# Patient Record
Sex: MALE | Race: WHITE | NOT HISPANIC OR LATINO | Employment: FULL TIME | URBAN - METROPOLITAN AREA
[De-identification: names, ages, dates, MRNs, and addresses within clinical notes are randomized per-mention and may not be internally consistent; named-entity substitution may affect disease eponyms.]

---

## 2017-01-27 ENCOUNTER — HOSPITAL ENCOUNTER (OUTPATIENT)
Dept: LAB | Facility: MEDICAL CENTER | Age: 52
End: 2017-01-27
Attending: NURSE PRACTITIONER
Payer: COMMERCIAL

## 2017-01-27 LAB
ALBUMIN SERPL BCP-MCNC: 4.5 G/DL (ref 3.2–4.9)
ALBUMIN/GLOB SERPL: 1.7 G/DL
ALP SERPL-CCNC: 34 U/L (ref 30–99)
ALT SERPL-CCNC: 17 U/L (ref 2–50)
ANION GAP SERPL CALC-SCNC: 3 MMOL/L (ref 0–11.9)
AST SERPL-CCNC: 15 U/L (ref 12–45)
BILIRUB SERPL-MCNC: 0.4 MG/DL (ref 0.1–1.5)
BUN SERPL-MCNC: 21 MG/DL (ref 8–22)
CALCIUM SERPL-MCNC: 9.4 MG/DL (ref 8.5–10.5)
CHLORIDE SERPL-SCNC: 110 MMOL/L (ref 96–112)
CHOLEST SERPL-MCNC: 185 MG/DL (ref 100–199)
CO2 SERPL-SCNC: 25 MMOL/L (ref 20–33)
CREAT SERPL-MCNC: 1.18 MG/DL (ref 0.5–1.4)
GLOBULIN SER CALC-MCNC: 2.6 G/DL (ref 1.9–3.5)
GLUCOSE SERPL-MCNC: 106 MG/DL (ref 65–99)
HDLC SERPL-MCNC: 63 MG/DL
LDLC SERPL CALC-MCNC: 96 MG/DL
POTASSIUM SERPL-SCNC: 4.3 MMOL/L (ref 3.6–5.5)
PROT SERPL-MCNC: 7.1 G/DL (ref 6–8.2)
PSA SERPL DL<=0.01 NG/ML-MCNC: 0.65 NG/ML (ref 0–4)
SODIUM SERPL-SCNC: 138 MMOL/L (ref 135–145)
TRIGL SERPL-MCNC: 131 MG/DL (ref 0–149)

## 2017-01-27 PROCEDURE — 84153 ASSAY OF PSA TOTAL: CPT

## 2017-01-27 PROCEDURE — 36415 COLL VENOUS BLD VENIPUNCTURE: CPT

## 2017-01-27 PROCEDURE — 80061 LIPID PANEL: CPT

## 2017-01-27 PROCEDURE — 80053 COMPREHEN METABOLIC PANEL: CPT

## 2017-09-06 DIAGNOSIS — Z01.812 PRE-OPERATIVE LABORATORY EXAMINATION: ICD-10-CM

## 2017-09-06 LAB
ANION GAP SERPL CALC-SCNC: 7 MMOL/L (ref 0–11.9)
BUN SERPL-MCNC: 13 MG/DL (ref 8–22)
CALCIUM SERPL-MCNC: 10.2 MG/DL (ref 8.5–10.5)
CHLORIDE SERPL-SCNC: 104 MMOL/L (ref 96–112)
CO2 SERPL-SCNC: 27 MMOL/L (ref 20–33)
CREAT SERPL-MCNC: 1.14 MG/DL (ref 0.5–1.4)
ERYTHROCYTE [DISTWIDTH] IN BLOOD BY AUTOMATED COUNT: 37.1 FL (ref 35.9–50)
GFR SERPL CREATININE-BSD FRML MDRD: >60 ML/MIN/1.73 M 2
GLUCOSE SERPL-MCNC: 87 MG/DL (ref 65–99)
HCT VFR BLD AUTO: 42.7 % (ref 42–52)
HGB BLD-MCNC: 14.7 G/DL (ref 14–18)
MCH RBC QN AUTO: 29.7 PG (ref 27–33)
MCHC RBC AUTO-ENTMCNC: 34.4 G/DL (ref 33.7–35.3)
MCV RBC AUTO: 86.3 FL (ref 81.4–97.8)
PLATELET # BLD AUTO: 246 K/UL (ref 164–446)
PMV BLD AUTO: 10.5 FL (ref 9–12.9)
POTASSIUM SERPL-SCNC: 3.6 MMOL/L (ref 3.6–5.5)
RBC # BLD AUTO: 4.95 M/UL (ref 4.7–6.1)
SODIUM SERPL-SCNC: 138 MMOL/L (ref 135–145)
WBC # BLD AUTO: 6.4 K/UL (ref 4.8–10.8)

## 2017-09-06 PROCEDURE — 80048 BASIC METABOLIC PNL TOTAL CA: CPT

## 2017-09-06 PROCEDURE — 85027 COMPLETE CBC AUTOMATED: CPT

## 2017-09-06 PROCEDURE — 36415 COLL VENOUS BLD VENIPUNCTURE: CPT

## 2017-09-06 RX ORDER — FENOFIBRATE 160 MG/1
160 TABLET ORAL DAILY
COMMUNITY
End: 2018-07-06 | Stop reason: SDUPTHER

## 2017-09-06 RX ORDER — ZOLPIDEM TARTRATE 10 MG/1
10 TABLET ORAL NIGHTLY PRN
COMMUNITY
End: 2018-07-06 | Stop reason: SDUPTHER

## 2017-09-16 ENCOUNTER — HOSPITAL ENCOUNTER (OUTPATIENT)
Facility: MEDICAL CENTER | Age: 52
End: 2017-09-16
Payer: COMMERCIAL

## 2017-09-16 LAB
ALBUMIN SERPL BCP-MCNC: 4.5 G/DL (ref 3.2–4.9)
ALBUMIN/GLOB SERPL: 1.5 G/DL
ALP SERPL-CCNC: 30 U/L (ref 30–99)
ALT SERPL-CCNC: 18 U/L (ref 2–50)
ANION GAP SERPL CALC-SCNC: 7 MMOL/L (ref 0–11.9)
AST SERPL-CCNC: 17 U/L (ref 12–45)
BDY FAT % MEASURED: 27.4 %
BILIRUB SERPL-MCNC: 0.6 MG/DL (ref 0.1–1.5)
BP DIAS: 75 MMHG
BP SYS: 118 MMHG
BUN SERPL-MCNC: 14 MG/DL (ref 8–22)
CALCIUM SERPL-MCNC: 9.9 MG/DL (ref 8.5–10.5)
CHLORIDE SERPL-SCNC: 106 MMOL/L (ref 96–112)
CHOLEST SERPL-MCNC: 162 MG/DL (ref 100–199)
CO2 SERPL-SCNC: 25 MMOL/L (ref 20–33)
CREAT SERPL-MCNC: 1.13 MG/DL (ref 0.5–1.4)
DIABETES HTDIA: NO
EVENT NAME HTEVT: NORMAL
GFR SERPL CREATININE-BSD FRML MDRD: >60 ML/MIN/1.73 M 2
GLOBULIN SER CALC-MCNC: 3 G/DL (ref 1.9–3.5)
GLUCOSE SERPL-MCNC: 93 MG/DL (ref 65–99)
HDLC SERPL-MCNC: 62 MG/DL
HYPERTENSION HTHYP: NO
LDLC SERPL CALC-MCNC: 86 MG/DL
POTASSIUM SERPL-SCNC: 4.4 MMOL/L (ref 3.6–5.5)
PROT SERPL-MCNC: 7.5 G/DL (ref 6–8.2)
SCREENING LOC CITY HTCIT: NORMAL
SCREENING LOC STATE HTSTA: NORMAL
SCREENING LOCATION HTLOC: NORMAL
SODIUM SERPL-SCNC: 138 MMOL/L (ref 135–145)
SUBSCRIBER ID HTSID: NORMAL
TRIGL SERPL-MCNC: 71 MG/DL (ref 0–149)

## 2017-09-20 ENCOUNTER — HOSPITAL ENCOUNTER (OUTPATIENT)
Facility: MEDICAL CENTER | Age: 52
End: 2017-09-20
Attending: SURGERY | Admitting: SURGERY
Payer: COMMERCIAL

## 2017-09-20 VITALS
TEMPERATURE: 97.2 F | RESPIRATION RATE: 14 BRPM | SYSTOLIC BLOOD PRESSURE: 132 MMHG | BODY MASS INDEX: 27.83 KG/M2 | DIASTOLIC BLOOD PRESSURE: 91 MMHG | HEART RATE: 53 BPM | OXYGEN SATURATION: 99 % | HEIGHT: 68 IN | WEIGHT: 183.64 LBS

## 2017-09-20 PROBLEM — K40.20 BILATERAL INGUINAL HERNIA: Status: RESOLVED | Noted: 2017-09-20 | Resolved: 2017-09-20

## 2017-09-20 PROBLEM — K40.20 BILATERAL INGUINAL HERNIA: Status: ACTIVE | Noted: 2017-09-20

## 2017-09-20 PROCEDURE — 501838 HCHG SUTURE GENERAL: Performed by: SURGERY

## 2017-09-20 PROCEDURE — C1781 MESH (IMPLANTABLE): HCPCS | Performed by: SURGERY

## 2017-09-20 PROCEDURE — 160036 HCHG PACU - EA ADDL 30 MINS PHASE I: Performed by: SURGERY

## 2017-09-20 PROCEDURE — 700111 HCHG RX REV CODE 636 W/ 250 OVERRIDE (IP)

## 2017-09-20 PROCEDURE — 160048 HCHG OR STATISTICAL LEVEL 1-5: Performed by: SURGERY

## 2017-09-20 PROCEDURE — A6402 STERILE GAUZE <= 16 SQ IN: HCPCS | Performed by: SURGERY

## 2017-09-20 PROCEDURE — 160047 HCHG PACU  - EA ADDL 30 MINS PHASE II: Performed by: SURGERY

## 2017-09-20 PROCEDURE — 700102 HCHG RX REV CODE 250 W/ 637 OVERRIDE(OP)

## 2017-09-20 PROCEDURE — 500380 HCHG DRAIN, PENROSE 1/4X12: Performed by: SURGERY

## 2017-09-20 PROCEDURE — 700101 HCHG RX REV CODE 250

## 2017-09-20 PROCEDURE — 160028 HCHG SURGERY MINUTES - 1ST 30 MINS LEVEL 3: Performed by: SURGERY

## 2017-09-20 PROCEDURE — A9270 NON-COVERED ITEM OR SERVICE: HCPCS

## 2017-09-20 PROCEDURE — 160046 HCHG PACU - 1ST 60 MINS PHASE II: Performed by: SURGERY

## 2017-09-20 PROCEDURE — 160009 HCHG ANES TIME/MIN: Performed by: SURGERY

## 2017-09-20 PROCEDURE — 160025 RECOVERY II MINUTES (STATS): Performed by: SURGERY

## 2017-09-20 PROCEDURE — 160039 HCHG SURGERY MINUTES - EA ADDL 1 MIN LEVEL 3: Performed by: SURGERY

## 2017-09-20 PROCEDURE — 700105 HCHG RX REV CODE 258

## 2017-09-20 PROCEDURE — 160002 HCHG RECOVERY MINUTES (STAT): Performed by: SURGERY

## 2017-09-20 PROCEDURE — 160035 HCHG PACU - 1ST 60 MINS PHASE I: Performed by: SURGERY

## 2017-09-20 DEVICE — MESH SOFT 4 X 6 (3/CA): Type: IMPLANTABLE DEVICE | Site: GROIN | Status: FUNCTIONAL

## 2017-09-20 RX ORDER — LIDOCAINE HYDROCHLORIDE 10 MG/ML
INJECTION, SOLUTION INFILTRATION; PERINEURAL
Status: COMPLETED
Start: 2017-09-20 | End: 2017-09-20

## 2017-09-20 RX ORDER — SODIUM CHLORIDE, SODIUM LACTATE, POTASSIUM CHLORIDE, CALCIUM CHLORIDE 600; 310; 30; 20 MG/100ML; MG/100ML; MG/100ML; MG/100ML
INJECTION, SOLUTION INTRAVENOUS CONTINUOUS
Status: DISCONTINUED | OUTPATIENT
Start: 2017-09-20 | End: 2017-09-20 | Stop reason: HOSPADM

## 2017-09-20 RX ORDER — OXYCODONE HCL 5 MG/5 ML
SOLUTION, ORAL ORAL
Status: COMPLETED
Start: 2017-09-20 | End: 2017-09-20

## 2017-09-20 RX ORDER — SODIUM CHLORIDE, SODIUM LACTATE, POTASSIUM CHLORIDE, CALCIUM CHLORIDE 600; 310; 30; 20 MG/100ML; MG/100ML; MG/100ML; MG/100ML
INJECTION, SOLUTION INTRAVENOUS
Status: DISCONTINUED
Start: 2017-09-20 | End: 2017-09-20 | Stop reason: HOSPADM

## 2017-09-20 RX ORDER — LIDOCAINE HYDROCHLORIDE 10 MG/ML
0.5 INJECTION, SOLUTION INFILTRATION; PERINEURAL
Status: COMPLETED | OUTPATIENT
Start: 2017-09-20 | End: 2017-09-20

## 2017-09-20 RX ORDER — BUPIVACAINE HYDROCHLORIDE AND EPINEPHRINE 5; 5 MG/ML; UG/ML
INJECTION, SOLUTION EPIDURAL; INTRACAUDAL; PERINEURAL
Status: DISCONTINUED | OUTPATIENT
Start: 2017-09-20 | End: 2017-09-20 | Stop reason: HOSPADM

## 2017-09-20 RX ORDER — ONDANSETRON 2 MG/ML
4 INJECTION INTRAMUSCULAR; INTRAVENOUS EVERY 4 HOURS PRN
Status: DISCONTINUED | OUTPATIENT
Start: 2017-09-20 | End: 2017-09-20 | Stop reason: HOSPADM

## 2017-09-20 RX ORDER — LIDOCAINE AND PRILOCAINE 25; 25 MG/G; MG/G
1 CREAM TOPICAL
Status: COMPLETED | OUTPATIENT
Start: 2017-09-20 | End: 2017-09-20

## 2017-09-20 RX ADMIN — FENTANYL CITRATE 25 MCG: 50 INJECTION, SOLUTION INTRAMUSCULAR; INTRAVENOUS at 10:20

## 2017-09-20 RX ADMIN — LIDOCAINE HYDROCHLORIDE 0.5 ML: 10 INJECTION, SOLUTION INFILTRATION; PERINEURAL at 06:55

## 2017-09-20 RX ADMIN — OXYCODONE HYDROCHLORIDE 10 MG: 5 SOLUTION ORAL at 10:20

## 2017-09-20 RX ADMIN — FENTANYL CITRATE 25 MCG: 50 INJECTION, SOLUTION INTRAMUSCULAR; INTRAVENOUS at 10:46

## 2017-09-20 RX ADMIN — SODIUM CHLORIDE, SODIUM LACTATE, POTASSIUM CHLORIDE, CALCIUM CHLORIDE: 600; 310; 30; 20 INJECTION, SOLUTION INTRAVENOUS at 06:55

## 2017-09-20 ASSESSMENT — PAIN SCALES - GENERAL
PAINLEVEL_OUTOF10: 4
PAINLEVEL_OUTOF10: 4
PAINLEVEL_OUTOF10: 3
PAINLEVEL_OUTOF10: 4
PAINLEVEL_OUTOF10: 0
PAINLEVEL_OUTOF10: 2

## 2017-09-20 NOTE — OR SURGEON
Operative Report    PreOp Diagnosis: Bilateral Inguinal Hernias    PostOp Diagnosis: Bilateral Inguinal Hernias (Left : direct  Right: Indirect)    Procedure(s):  INGUINAL HERNIA REPAIR BILATERAL WITH Bard soft MESH   - Wound Class: Clean    Surgeon(s):  Ed Ro M.D.    Anesthesiologist/Type of Anesthesia:LMA  Anesthesiologist: Khoa Peacock M.D./General    Surgical Staff:  Circulator: Meka Wise R.N.  Relief Circulator: Lu Doll R.N.  Scrub Person: Leonila Mcmahon    Specimens:  none    Estimated Blood Loss: none    Findings: direct on the left and indirect on the right    Complications: none        9/20/2017 9:22 AM Ed Ro

## 2017-09-20 NOTE — OR NURSING
0920 received pt from Or with Dr. Peacock, nasal airway in place orally, VSS breathing even and unlabored. Dressing to bilateral groin CDI  1015 airway removed without difficulty. Ice pack applied to surgical sites.  1020 pt medicated for 4/10 pain - see MAR  1103 report called to Gali Villalobos, plan of care discussed. Pt transferred to discharge area # 31.

## 2017-09-20 NOTE — DISCHARGE INSTRUCTIONS
ACTIVITY: Rest and take it easy for the first 24 hours.  A responsible adult is recommended to remain with you during that time.  It is normal to feel sleepy.  We encourage you to not do anything that requires balance, judgment or coordination.    MILD FLU-LIKE SYMPTOMS ARE NORMAL. YOU MAY EXPERIENCE GENERALIZED MUSCLE ACHES, THROAT IRRITATION, HEADACHE AND/OR SOME NAUSEA.    FOR 24 HOURS DO NOT:  Drive, operate machinery or run household appliances.  Drink beer or alcoholic beverages.   Make important decisions or sign legal documents.    SPECIAL INSTRUCTIONS:   1) Activity: no lifting weight greater than 20lbs x 4 weeks  2) Diet:clear liquids tonight, then advance as tolerated in AM  3) Showers only for 2 weeks; no tub baths, hot tubs or swimming  4) No driving for one week or while on pain medications  5) Wound Care:remove tegaderms in 4 days (on 9/24/17)    DIET: To avoid nausea, slowly advance diet as tolerated, avoiding spicy or greasy foods for the first day.  Add more substantial food to your diet according to your physician's instructions. INCREASE FLUIDS AND FIBER TO AVOID CONSTIPATION.      FOLLOW-UP APPOINTMENT:  A follow-up appointment should be arranged with your doctor in 7-10 days; call to schedule.    You should CALL YOUR PHYSICIAN if you develop:  Fever greater than 101 degrees F.  Pain not relieved by medication, or persistent nausea or vomiting.  Excessive bleeding (blood soaking through dressing) or unexpected drainage from the wound.  Extreme redness or swelling around the incision site, drainage of pus or foul smelling drainage.  Inability to urinate or empty your bladder within 8 hours.  Problems with breathing or chest pain.    You should call 911 if you develop problems with breathing or chest pain.  If you are unable to contact your doctor or surgical center, you should go to the nearest emergency room or urgent care center.  Physician's telephone #: BRIT Ro: [phone]    If any  questions arise, call your doctor.  If your doctor is not available, please feel free to call the Surgical Center at [phone].  The Center is open Monday through Friday from 7AM to 7PM.  You can also call the HEALTH HOTLINE open 24 hours/day, 7 days/week and speak to a nurse at [phone], or toll free at [phone].    A registered nurse may call you a few days after your surgery to see how you are doing after your procedure.    MEDICATIONS: Resume taking daily medication.  Take prescribed pain medication with food.  If no medication is prescribed, you may take non-aspirin pain medication if needed.  PAIN MEDICATION CAN BE VERY CONSTIPATING.  Take a stool softener or laxative such as senokot, pericolace, or milk of magnesia if needed.    Prescription given for PERCOCET (pain) AND ZOFRAN (nausea).  Last pain medication given at 10:20am (you may take next dose anytime after 4:20pm.)    Your physician has prescribed pain medication that includes Acetaminophen (Tylenol), do not take additional Acetaminophen (Tylenol) while taking the prescribed medication.    Depression / Suicide Risk    As you are discharged from this [facility] facility, it is important to learn how to keep safe from harming yourself.    Recognize the warning signs:  · Abrupt changes in personality, positive or negative- including increase in energy   · Giving away possessions  · Change in eating patterns- significant weight changes-  positive or negative  · Change in sleeping patterns- unable to sleep or sleeping all the time   · Unwillingness or inability to communicate  · Depression  · Unusual sadness, discouragement and loneliness  · Talk of wanting to die  · Neglect of personal appearance   · Rebelliousness- reckless behavior  · Withdrawal from people/activities they love  · Confusion- inability to concentrate     If you or a loved one observes any of these behaviors or has concerns about self-harm, here's what you can  do:  · Talk about it- your feelings and reasons for harming yourself  · Remove any means that you might use to hurt yourself (examples: pills, rope, extension cords, firearm)  · Get professional help from the community (Mental Health, Substance Abuse, psychological counseling)  · Do not be alone:Call your Safe Contact- someone whom you trust who will be there for you.  · Call your local CRISIS HOTLINE 226-0159 or 951-737-2908  · Call your local Children's Mobile Crisis Response Team [place] (012) 441-6775 or www.X2TV  · Call the toll free National Suicide Prevention Hotlines   · National Suicide Prevention Lifeline 496-304-PWTA (9525)  · National Hope Line Network 800-SUICIDE (232-5914)

## 2017-09-20 NOTE — OP REPORT
DATE OF SERVICE:  09/20/2017    PREOPERATIVE DIAGNOSIS:  Bilateral inguinal hernias.    POSTOPERATIVE DIAGNOSIS:  Bilateral inguinal hernias with a left direct and   right indirect inguinal hernia.    OPERATION PERFORMED:  Bilateral inguinal hernia repair with Bard soft mesh.    ANESTHESIA:  LMA general by Dr. Peacock.    SURGEON:  Ed Ro MD    INDICATIONS:  Patient with symptomatic inguinal hernias.    OPERATIVE FINDINGS:  A left direct inguinal hernia and a right   indirect/partial direct inguinal hernia.    OPERATIVE NOTE:  The patient was taken to the operating room, placed in the   supine position, given LMA general anesthetic.  Once properly anesthetized, he   was prepped and draped in usual sterile fashion.  Marcaine 0.5% with   epinephrine was used to anesthetize the skin over the inguinal canal.  A   transverse incision was made over the left inguinal canal and carried down   through the skin and subcutaneous tissue.  The external oblique fascia was   exposed, opened sharply in the direction of its fibers through the external   ring and above the internal ring.  The spermatic cord was digitally   manipulated, brought up in the operative field and the cord was skeletonized.    There was a small preperitoneal fat component that was clamped at its base   and tied off with a 2-0 Vicryl tie with the true defect was a direct inguinal   hernia quite large.  This was reduced and the transversalis was reapproximated   down to the ilioinguinal ligament with interrupted 0 Ethibond to allow the   floor to be closed, so this large sac was not bulging into the operative   field.  A relaxing incision was made along the medial edge of the internal   oblique.  A definitive repair was then conducted by taking a piece of Bard   soft mesh and placing it at the pubic tubercle with a horizontal mattress 2-0   PDS suture running laterally along the ilioinguinal ligament, making   transition stitch onto the internal oblique  where it was tied.  Interrupted   2-0 PDS were then used to put the tails together and then around the mid   portion of the medial edge, another running 2-0 PDS was carried back down to   the pubic tubercle where it was tied with the original stitch.  The external   oblique fascia was closed back over the cord and mesh with a running 2-0   Vicryl, subcutaneous tissues reapproximated with 3-0 Vicryls, and the skin was   closed with a 4-0 Vicryl subcuticular closure.    A mirror incision was made on the right.  Again, the skin was anesthetized   with 0.5% Marcaine with epinephrine and incision was carried down through the   skin and subcutaneous tissue.  The external oblique fascia was exposed, opened   sharply through its fibers through the external ring and above the internal   ring.  This cord was again skeletonized and this time, there was a large   indirect sac, but it was a little more inferior to the internal ring, even   though it was coming through the internal ring, so I had a little bit of a   direct component to it too.  This was reduced.  The floor was reapproximated   again with interrupted 0 Ethibond to keep the sac from bulging into the   operative field while the definitive mesh repair was conducted.  Again, a 4x6   piece of Bard soft mesh was secured at the pubic tubercle with a running 2-0   PDS running laterally along the ilioinguinal ligament, making transition   stitch onto the internal oblique.  The tails were sewn together with   interrupted 2-0 PDS.  Another 2-0 PDS was sewn at the medial edge back down   with a running stitch to the pubic tubercle, the original stitch and tied.    There was adequate room for the cord.  The external oblique fascia was closed   back over the cord and mesh with a running 2-0 Vicryl, the subcutaneous   tissues reapproximated with 3-0 Vicryls, and the skin was closed with a 4-0   Vicryl subcuticular closure.  Patient tolerated the procedure well.    Additional  0.5% Marcaine with epinephrine was applied to both incisions to   provide a postoperative analgesic block and Tegaderm dressings were applied.       ____________________________________     MD AMIE Dhillon / ASHKAN    DD:  09/20/2017 09:35:33  DT:  09/20/2017 09:54:04    D#:  [id number]  Job#:  [id number]

## 2017-12-12 ENCOUNTER — OFFICE VISIT (OUTPATIENT)
Dept: URGENT CARE | Facility: PHYSICIAN GROUP | Age: 52
End: 2017-12-12
Payer: COMMERCIAL

## 2017-12-12 VITALS
HEART RATE: 71 BPM | RESPIRATION RATE: 16 BRPM | TEMPERATURE: 98 F | HEIGHT: 68 IN | SYSTOLIC BLOOD PRESSURE: 128 MMHG | DIASTOLIC BLOOD PRESSURE: 88 MMHG | BODY MASS INDEX: 28.95 KG/M2 | OXYGEN SATURATION: 100 % | WEIGHT: 191 LBS

## 2017-12-12 DIAGNOSIS — J20.8 ACUTE BACTERIAL BRONCHITIS: ICD-10-CM

## 2017-12-12 DIAGNOSIS — B96.89 ACUTE BACTERIAL BRONCHITIS: ICD-10-CM

## 2017-12-12 PROCEDURE — 99204 OFFICE O/P NEW MOD 45 MIN: CPT | Performed by: NURSE PRACTITIONER

## 2017-12-12 RX ORDER — AZITHROMYCIN 250 MG/1
TABLET, FILM COATED ORAL
Qty: 6 TAB | Refills: 0 | Status: SHIPPED | OUTPATIENT
Start: 2017-12-12 | End: 2018-07-06

## 2017-12-12 RX ORDER — ALBUTEROL SULFATE 90 UG/1
2 AEROSOL, METERED RESPIRATORY (INHALATION) EVERY 6 HOURS PRN
Qty: 8.5 G | Refills: 0 | Status: SHIPPED | OUTPATIENT
Start: 2017-12-12 | End: 2018-07-06

## 2017-12-12 RX ORDER — IPRATROPIUM BROMIDE AND ALBUTEROL SULFATE 2.5; .5 MG/3ML; MG/3ML
3 SOLUTION RESPIRATORY (INHALATION) ONCE
Status: COMPLETED | OUTPATIENT
Start: 2017-12-12 | End: 2017-12-12

## 2017-12-12 RX ADMIN — IPRATROPIUM BROMIDE AND ALBUTEROL SULFATE 3 ML: 2.5; .5 SOLUTION RESPIRATORY (INHALATION) at 15:46

## 2017-12-12 ASSESSMENT — ENCOUNTER SYMPTOMS
NAUSEA: 0
DIZZINESS: 0
SORE THROAT: 1
EYE PAIN: 0
RHINORRHEA: 0
HEADACHES: 1
FEVER: 0
VOMITING: 0
CHILLS: 0
DIARRHEA: 0
WHEEZING: 1
MYALGIAS: 0
SINUS PAIN: 0
COUGH: 1
SHORTNESS OF BREATH: 1

## 2017-12-12 ASSESSMENT — PAIN SCALES - GENERAL: PAINLEVEL: NO PAIN

## 2017-12-12 NOTE — PROGRESS NOTES
Subjective:     Ki Melo is a 52 y.o. male who presents for URI (x 2 week)       URI    This is a new problem. The current episode started 1 to 4 weeks ago. The problem has been unchanged. The maximum temperature recorded prior to his arrival was 100.4 - 100.9 F. Associated symptoms include chest pain, congestion, coughing, headaches, a sore throat and wheezing. Pertinent negatives include no diarrhea, nausea, rash, rhinorrhea, sinus pain or vomiting. He has tried NSAIDs and decongestant for the symptoms. The treatment provided no relief.     Past Medical History:   Diagnosis Date   • Heart burn    • High cholesterol      Past Surgical History:   Procedure Laterality Date   • INGUINAL HERNIA REPAIR BILATERAL Bilateral 9/20/2017    Procedure: INGUINAL HERNIA REPAIR BILATERAL WITH MESH  ;  Surgeon: Ed Ro M.D.;  Location: SURGERY [facility];  Service: General   • OTHER ORTHOPEDIC SURGERY  2009    ORIF left wrist   • SEPTOTURBINOPLASTY  6/4/08    Performed by ED TIRADO at SURGERY SAME DAY [facility]   • OTHER ORTHOPEDIC SURGERY  2007    right knee scope     Social History     Social History   • Marital status:      Spouse name: N/A   • Number of children: N/A   • Years of education: N/A     Occupational History   • Not on file.     Social History Main Topics   • Smoking status: Never Smoker   • Smokeless tobacco: Never Used   • Alcohol use Yes      Comment: 2 per day   • Drug use: Unknown   • Sexual activity: Not on file     Other Topics Concern   • Not on file     Social History Narrative   • No narrative on file    No family history on file. Review of Systems   Constitutional: Negative for chills and fever.   HENT: Positive for congestion and sore throat. Negative for rhinorrhea and sinus pain.    Eyes: Negative for pain.   Respiratory: Positive for cough, shortness of breath and wheezing.    Cardiovascular: Positive for chest pain.   Gastrointestinal: Negative for diarrhea,  "nausea and vomiting.   Genitourinary: Negative for hematuria.   Musculoskeletal: Negative for myalgias.   Skin: Negative for rash.   Neurological: Positive for headaches. Negative for dizziness.   No Known Allergies   Objective:   /88   Pulse 71   Temp 36.7 °C (98 °F)   Resp 16   Ht 1.727 m (5' 8\")   Wt 86.6 kg (191 lb)   SpO2 100%   BMI 29.04 kg/m²   Physical Exam   Constitutional: He is oriented to person, place, and time. He appears well-developed and well-nourished. No distress.   HENT:   Head: Normocephalic and atraumatic.   Right Ear: Tympanic membrane normal.   Left Ear: Tympanic membrane normal.   Nose: Nose normal. Right sinus exhibits no maxillary sinus tenderness and no frontal sinus tenderness. Left sinus exhibits no maxillary sinus tenderness and no frontal sinus tenderness.   Mouth/Throat: Uvula is midline, oropharynx is clear and moist and mucous membranes are normal. No posterior oropharyngeal edema, posterior oropharyngeal erythema or tonsillar abscesses. No tonsillar exudate.   Eyes: Conjunctivae and EOM are normal. Pupils are equal, round, and reactive to light. Right eye exhibits no discharge. Left eye exhibits no discharge.   Cardiovascular: Normal rate and regular rhythm.    No murmur heard.  Pulmonary/Chest: Effort normal. No respiratory distress. He has wheezes. He has rhonchi.   Lung sounds improved with breathing treatment. PO2 reassessed and adequate.      Abdominal: Soft. He exhibits no distension. There is no tenderness.   Neurological: He is alert and oriented to person, place, and time. He has normal reflexes. No sensory deficit.   Skin: Skin is warm, dry and intact.   Psychiatric: He has a normal mood and affect.   Vitals reviewed.        Assessment/Plan:   Assessment    1. Acute bacterial bronchitis  - ipratropium-albuterol (DUONEB) nebulizer solution 3 mL; 3 mL by Nebulization route Once.  - azithromycin (ZITHROMAX) 250 MG Tab; Take 2 tablets by mouth on day one. Take " one tablet by mouth the remaining days until gone  Dispense: 6 Tab; Refill: 0  - albuterol 108 (90 Base) MCG/ACT Aero Soln inhalation aerosol; Inhale 2 Puffs by mouth every 6 hours as needed for Shortness of Breath.  Dispense: 8.5 g; Refill: 0       Lung sounds improved with breathing treatment. PO2 reassessed and adequate. Patient with a productive cough, fevers, improved lung sounds we'll treat empirically with antibiotic therapy for acute bacterial process.  Advised patient to taking over-the-counter ibuprofen and/or Tylenol for comfort and fevers.  Patient given precautionary s/sx that mandate immediate follow up and evaluation in the ED. Advised of risks of not doing so.    DDX, Supportive care, and indications for immediate follow-up discussed with patient.    Instructed to return to clinic or nearest emergency department if we are not available for any change in condition, further concerns, or worsening of symptoms.  The patient demonstrated a good understanding and agreed with the treatment plan.

## 2018-02-15 ENCOUNTER — OFFICE VISIT (OUTPATIENT)
Dept: URGENT CARE | Facility: CLINIC | Age: 53
End: 2018-02-15
Payer: COMMERCIAL

## 2018-02-15 VITALS
TEMPERATURE: 97.1 F | SYSTOLIC BLOOD PRESSURE: 124 MMHG | RESPIRATION RATE: 16 BRPM | DIASTOLIC BLOOD PRESSURE: 86 MMHG | BODY MASS INDEX: 28.89 KG/M2 | WEIGHT: 190 LBS | OXYGEN SATURATION: 97 % | HEART RATE: 76 BPM

## 2018-02-15 DIAGNOSIS — S61.212A LACERATION OF RIGHT MIDDLE FINGER WITHOUT FOREIGN BODY WITHOUT DAMAGE TO NAIL, INITIAL ENCOUNTER: ICD-10-CM

## 2018-02-15 PROCEDURE — 12001 RPR S/N/AX/GEN/TRNK 2.5CM/<: CPT | Performed by: PHYSICIAN ASSISTANT

## 2018-02-15 ASSESSMENT — ENCOUNTER SYMPTOMS
SINUS PAIN: 0
EYE DISCHARGE: 0
SORE THROAT: 0
EYE REDNESS: 0
CHILLS: 0
TINGLING: 0
SENSORY CHANGE: 0
FEVER: 0

## 2018-02-15 NOTE — PROGRESS NOTES
Subjective:      Ki Melo is a 52 y.o. male who presents with Laceration (x this am, laceration on Rt. middle finger)            Patient is a 52-year-old male who presents today with right finger laceration after his hand slipped while holding a razor blade trying to cut tape approximately 2 hours ago. Patient denies any pain, deformity sensation, use of blood thinners. Patient reports his last tetanus was updated last year.      Laceration    The incident occurred 1 to 3 hours ago. Pain location: finger. Size: 1.5 cm. The pain is at a severity of 0/10. The patient is experiencing no pain. He reports no foreign bodies present. His tetanus status is UTD.       Review of Systems   Constitutional: Negative for chills and fever.   HENT: Negative for sinus pain and sore throat.    Eyes: Negative for discharge and redness.   Skin: Negative for itching and rash.   Neurological: Negative for tingling and sensory change.   All other systems are reviewed and are negative.          Objective:     /86   Pulse 76   Temp 36.2 °C (97.1 °F)   Resp 16   Wt 86.2 kg (190 lb)   SpO2 97%   BMI 28.89 kg/m²    PMH:  has a past medical history of Heart burn and High cholesterol.  MEDS:   Current Outpatient Prescriptions:   •  fenofibrate (TRIGLIDE) 160 MG tablet, Take 160 mg by mouth every day., Disp: , Rfl:   •  zolpidem (AMBIEN) 10 MG Tab, Take 10 mg by mouth at bedtime as needed for Sleep., Disp: , Rfl:   •  azithromycin (ZITHROMAX) 250 MG Tab, Take 2 tablets by mouth on day one. Take one tablet by mouth the remaining days until gone, Disp: 6 Tab, Rfl: 0  •  albuterol 108 (90 Base) MCG/ACT Aero Soln inhalation aerosol, Inhale 2 Puffs by mouth every 6 hours as needed for Shortness of Breath., Disp: 8.5 g, Rfl: 0  •  Multiple Vitamins-Minerals (MULTIVITAMIN ADULT PO), Take 1 Tab by mouth every day., Disp: , Rfl:   ALLERGIES: No Known Allergies  SURGHX:   Past Surgical History:   Procedure Laterality Date   •  INGUINAL HERNIA REPAIR BILATERAL Bilateral 9/20/2017    Procedure: INGUINAL HERNIA REPAIR BILATERAL WITH MESH  ;  Surgeon: Ed Ro M.D.;  Location: SURGERY [facility];  Service: General   • OTHER ORTHOPEDIC SURGERY  2009    ORIF left wrist   • SEPTOTURBINOPLASTY  6/4/08    Performed by ED TIRADO at SURGERY SAME DAY [facility]   • OTHER ORTHOPEDIC SURGERY  2007    right knee scope     SOCHX:  reports that he has never smoked. He has never used smokeless tobacco. He reports that he drinks alcohol.  FH: Family history was reviewed, no pertinent findings to report    Physical Exam   Constitutional: He is oriented to person, place, and time. He appears well-developed and well-nourished.   HENT:   Head: Normocephalic and atraumatic.   Eyes: EOM are normal. Pupils are equal, round, and reactive to light.   Musculoskeletal: Normal range of motion. He exhibits no deformity.        Hands:  Right middle finger- distal portion- palmar aspect. 1.5 cm superificial laceration appears clean- minimal blood loss- irrigated and cleansed with hibiclens. Pt. Opted to have dermabond utilized today.   Skin with great approximation. Pt tolerated well. Dressing applied.   N/V intact distally.    Neurological: He is alert and oriented to person, place, and time.   Skin: Capillary refill takes less than 2 seconds.   Psychiatric: He has a normal mood and affect. His behavior is normal.   Vitals reviewed.              Assessment/Plan:     1. Laceration of right middle finger without foreign body without damage to nail, initial encounter    Wound care discussed. Skin was with great approximation. S/S of infection were discussed and pt. On educated on what to return for.   RTC PRN.

## 2018-07-06 ENCOUNTER — OFFICE VISIT (OUTPATIENT)
Dept: MEDICAL GROUP | Facility: PHYSICIAN GROUP | Age: 53
End: 2018-07-06
Payer: COMMERCIAL

## 2018-07-06 VITALS
OXYGEN SATURATION: 98 % | RESPIRATION RATE: 16 BRPM | HEIGHT: 68 IN | DIASTOLIC BLOOD PRESSURE: 82 MMHG | TEMPERATURE: 98.2 F | SYSTOLIC BLOOD PRESSURE: 130 MMHG | HEART RATE: 72 BPM | BODY MASS INDEX: 28.89 KG/M2 | WEIGHT: 190.6 LBS

## 2018-07-06 DIAGNOSIS — E78.00 HIGH CHOLESTEROL: ICD-10-CM

## 2018-07-06 DIAGNOSIS — F51.01 PRIMARY INSOMNIA: ICD-10-CM

## 2018-07-06 PROBLEM — G47.00 INSOMNIA: Status: ACTIVE | Noted: 2018-07-06

## 2018-07-06 PROCEDURE — 99203 OFFICE O/P NEW LOW 30 MIN: CPT | Performed by: INTERNAL MEDICINE

## 2018-07-06 RX ORDER — FENOFIBRATE 160 MG/1
160 TABLET ORAL DAILY
Qty: 90 TAB | Refills: 1 | Status: SHIPPED | OUTPATIENT
Start: 2018-07-06 | End: 2019-10-10

## 2018-07-06 RX ORDER — ZOLPIDEM TARTRATE 10 MG/1
10 TABLET ORAL NIGHTLY PRN
Qty: 90 TAB | Refills: 0 | Status: SHIPPED | OUTPATIENT
Start: 2018-07-06 | End: 2018-10-04

## 2018-07-06 ASSESSMENT — PATIENT HEALTH QUESTIONNAIRE - PHQ9: CLINICAL INTERPRETATION OF PHQ2 SCORE: 0

## 2018-07-06 NOTE — LETTER
[facility]  Melanie Porter D.O.  [address] [place] [address]  Fax: [phone]   Authorization for Release/Disclosure of   Protected Health Information   Name: MICHAEL PHAM : 1965 SSN: [id number]   Address: [address] [place] [address]  [place] [address] Phone:    [phone] (home) [phone] (work)   I authorize the entity listed below to release/disclose the PHI below to:   [facility]/Melanie Porter D.O. and Kartik Mario M.D.   Provider or Entity Name:  [facility]   Address   [facility], Zip            [address] [facility] [place] [address] Phone:  [phone]      Fax:       [phone]          Reason for request: continuity of care   Information to be released:    [ X ] LAST COLONOSCOPY,  including any PATH REPORT and follow-up  [ X ] LAST FIT/COLOGUARD RESULT [  ] LAST DEXA  [  ] LAST MAMMOGRAM  [  ] LAST PAP  [  ] LAST LABS [  ] RETINA EXAM REPORT  [  ] IMMUNIZATION RECORDS  [  ] Release all info      [  ] Check here and initial the line next to each item to release ALL health information INCLUDING  _____ Care and treatment for drug and / or alcohol abuse  _____ HIV testing, infection status, or AIDS  _____ Genetic Testing    DATES OF SERVICE OR TIME PERIOD TO BE DISCLOSED: _____________  I understand and acknowledge that:  * This Authorization may be revoked at any time by you in writing, except if your health information has already been used or disclosed.  * Your health information that will be used or disclosed as a result of you signing this authorization could be re-disclosed by the recipient. If this occurs, your re-disclosed health information may no longer be protected by State or Federal laws.  * You may refuse to sign this Authorization. Your refusal will not affect your ability to obtain treatment.  * This Authorization becomes effective upon signing and will  on (date) __________.      If no date is indicated, this Authorization will   one (1) year from the signature date.    Name: Ki Medina Kameron       Date:     2018       PLEASE FAX REQUESTED RECORDS BACK TO: [phone]

## 2018-07-06 NOTE — ASSESSMENT & PLAN NOTE
Started ambien two years ago. Has no problems falling asleep but has issues staying asleep. Doesn't take ambien on nights he's had a long, tiring day at work and on those nights he may still get up once at night but is able to fall back sleep. Usually wakes up around 3 am. Had a sleep study years ago and was told repair of his deviated septum would help him sleep better and it helped for some time. When he ends up waking up once in the night he will get up and go to the bathroom and then is able to come back to bed and fall back asleep. On ambien he thinks it stops him from dreaming; when he doesn't take ambien he has very vivid dreams and he feels fully awake during his dreams and like he isn't sleeping. Doesn't have a TV in his bedroom. He does eat late (around 8 pm) and wakes up early around 5 am and was having problems with acid reflux so he would have to stay up about an hour or two to digest before bedtime.

## 2018-07-06 NOTE — ASSESSMENT & PLAN NOTE
Controlled on fenofibrate for about two years.     Lab Results   Component Value Date/Time    CHOLSTRLTOT 162 09/16/2017 09:20 AM    LDL 86 09/16/2017 09:20 AM    HDL 62 09/16/2017 09:20 AM    TRIGLYCERIDE 71 09/16/2017 09:20 AM

## 2018-07-06 NOTE — PROGRESS NOTES
PRIMARY CARE CLINIC NEW PATIENT H&P  Chief Complaint   Patient presents with   • Insomnia     Ambien Refill    • Other     fenofibrate (TRIGLIDE) Refill      History of Present Illness     High cholesterol  Controlled on fenofibrate for about two years.     Lab Results   Component Value Date/Time    CHOLSTRLTOT 162 09/16/2017 09:20 AM    LDL 86 09/16/2017 09:20 AM    HDL 62 09/16/2017 09:20 AM    TRIGLYCERIDE 71 09/16/2017 09:20 AM         Insomnia  Started ambien two years ago. Has no problems falling asleep but has issues staying asleep. Doesn't take ambien on nights he's had a long, tiring day at work and on those nights he may still get up once at night but is able to fall back sleep. Usually wakes up around 3 am. Had a sleep study years ago and was told repair of his deviated septum would help him sleep better and it helped for some time. When he ends up waking up once in the night he will get up and go to the bathroom and then is able to come back to bed and fall back asleep. On ambien he thinks it stops him from dreaming; when he doesn't take ambien he has very vivid dreams and he feels fully awake during his dreams and like he isn't sleeping. Doesn't have a TV in his bedroom. He does eat late (around 8 pm) and wakes up early around 5 am and was having problems with acid reflux so he would have to stay up about an hour or two to digest before bedtime.     Current Outpatient Prescriptions   Medication Sig Dispense Refill   • fenofibrate (TRIGLIDE) 160 MG tablet Take 1 Tab by mouth every day. 90 Tab 1   • zolpidem (AMBIEN) 10 MG Tab Take 1 Tab by mouth at bedtime as needed for Sleep for up to 90 days. 90 Tab 0     No current facility-administered medications for this visit.        Past Medical History:   Diagnosis Date   • Heart burn    • High cholesterol    • Insomnia 7/6/2018     Past Surgical History:   Procedure Laterality Date   • INGUINAL HERNIA REPAIR BILATERAL Bilateral 9/20/2017    Procedure: INGUINAL  "HERNIA REPAIR BILATERAL WITH MESH  ;  Surgeon: Ed Ro M.D.;  Location: SURGERY [facility];  Service: General   • OTHER ORTHOPEDIC SURGERY      ORIF left wrist   • SEPTOTURBINOPLASTY  08    Performed by ED TIRADO at SURGERY SAME DAY [facility]   • OTHER ORTHOPEDIC SURGERY      right knee scope     Social History   Substance Use Topics   • Smoking status: Never Smoker   • Smokeless tobacco: Never Used   • Alcohol use 8.4 oz/week     14 Cans of beer per week     Social History     Social History Narrative    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    ROS  Constitutional: Negative for fatigue/generalized weakness.   HEENT: Negative for  vision changes, hearing changes    Respiratory: Negative for shortness of breath  Cardiovascular: Negative for chest pain, palpitations  Gastrointestinal: Negative for blood in stool, constipation, diarrhea  Genitourinary: Negative for dysuria, polyuria  Musculoskeletal: Positive for knee pain (hx of meniscus repair)  Skin: Negative for rash  Neurological: Negative for numbness, tingling  Psychiatric/Behavioral: Negative for depression, anxiety       Objective   Blood pressure 130/82, pulse 72, temperature 36.8 °C (98.2 °F), resp. rate 16, height 1.727 m (5' 8\"), weight 86.5 kg (190 lb 9.6 oz), SpO2 98 %. Body mass index is 28.98 kg/m².    General: Alert, oriented. In no acute distress   HEET: EOMI, PERRL, conjunctiva non-injected, sclera non-icteric.  Nares patent with no significant congestion or drainage.  Suzie pinnae, external auditory canals, TM pearly gray with normal light reflex bilaterally.Oral mucous membranes pink and moist with " "no lesions.  Neck: supple with no cervical, subclavicular lymphadenopathy, JVD, palpable thyroid nodules   Lungs: clear to auscultation bilaterally with good excursion.  CV: regular rate and rhythm.  Abdomen soft, non-distended, non-tender with normal bowel sounds. No hepatosplenomegaly, no masses palpated  Skin: no lesions. Warm, dry   Psychiatric: appropriate mood and affect     Assessment and Plan   The following treatment plan was discussed     1. High cholesterol  Lipids at goal as of 9/2017. Did discuss that his beer intake could very well be contributing to hypertriglyceridemia. He says he cannot quit entirely but will work on cutting back. Will follow up in 3 months.   - fenofibrate (TRIGLIDE) 160 MG tablet; Take 1 Tab by mouth every day.  Dispense: 90 Tab; Refill: 1    2. Primary insomnia  Discussed cutting down on beer intake as alcohol could very well contribute to insomnia; provided resource handout on sleep hygiene and recommended \"Say Good Night to Insomnia\". Discussed need for 3 month follow up as ambien is a controlled substance, he expressed understanding. Will follow up in 3 months.   - zolpidem (AMBIEN) 10 MG Tab; Take 1 Tab by mouth at bedtime as needed for Sleep for up to 90 days.  Dispense: 90 Tab; Refill: 0      Return in about 3 months (around 10/6/2018).    Health Maintenance      Health Maintenance Due   Topic Date Due   • COLONOSCOPY  09/03/2016     Says last colonoscopy was at age 49 and was due in 3 years; will request records    Kartik Mario MD  Internal Medicine  [facility]                   "

## 2018-07-06 NOTE — PATIENT INSTRUCTIONS
"· Check out the book \"Say Good Night to Insomnia\"    Sleep Hygiene Tips - Research & Treatments  From American Sleep Association    Getting good sleep is important in maintaining health. There are several things that you can do to promote good sleep and sleep hygiene, and ultimately get better sleep.  What is sleep hygiene?  Sleep hygiene is defined as behaviors that one can do to help promote good sleep using behavioral interventions.    TIPS:     Maintain a regular sleep routine  · Go to bed at the same time. Wake up at the same time. Ideally, your schedule will remain the same (+/- 20 minutes) every night of the week.  Avoid naps if possible  Naps decrease the ‘Sleep Debt’ that is so necessary for easy sleep onset.  Each of us needs a certain amount of sleep per 24-hour period. We need that amount, and we don’t need more than that.When we take naps, it decreases the amount of sleep that we need the next night - which may cause sleep fragmentation and difficulty initiating sleep, and may lead to insomnia.  Don’t stay in bed awake for more than 5-10 minutes.  If you find your mind racing, or worrying about not being able to sleep during the middle of the night, get out of bed, and sit in a chair in the dark. Do your mind racing in the chair until you are sleepy, then return to bed. No TV or internet during these periods! That will just stimulate you more than desired.            If this happens several times during the night, that is OK. Just maintain your           regular wake time, and try to avoid naps.  Don’t watch TV or read in bed.  When you watch TV or read in bed, you associate the bed with wakefulness.  Drink caffeinated drinks with caution   · The effects of caffeine may last for several hours after ingestion. Caffeine can fragment sleep, and cause difficulty initiating sleep. If you drink caffeine, use it only before noon.   Remember that soda and tea contain caffeine as well.  Avoid inappropriate " substances that interfere with sleep       Cigarettes, alcohol, and over-the-counter medications may cause fragmented sleep.  Exercise regularly  Exercise before 2 pm every day. Exercise promotes continuous sleep.  · Avoid rigorous exercise before bedtime. Rigorous exercise circulates endorphins into the body which may cause difficulty initiating sleep.   Have a quiet, comfortable bedroom      Set your bedroom thermostat at a comfortable temperature. Generally, a little cooler        is better than a little warmer. Turn off the TV and other extraneous noise that may disrupt sleep. Background ‘white noise’ like a fan is Ok. If your pets awaken you, keep them outside the bedroom. Your bedroom should be dark. Turn off bright lights.  Have a comfortable mattress.  If you are a ‘clock watcher’ at night, hide the clock.  Have a comfortable pre-bedtime routine  A warm bath, shower  Meditation, or quiet time  Some who are struggling with sleep regularly find it helpful to print out these recommendations and read them regularly. If you accidentally miss some of recommendations, or have a bad night, do not fret. By following these sleep hygiene recommendations, you will help yourself to get into a routine that promotes good sleep opportunities.

## 2018-09-08 ENCOUNTER — HOSPITAL ENCOUNTER (OUTPATIENT)
Facility: MEDICAL CENTER | Age: 53
End: 2018-09-08
Payer: COMMERCIAL

## 2018-09-08 LAB
BDY FAT % MEASURED: 29.3 %
BP DIAS: 86 MMHG
BP SYS: 134 MMHG
DIABETES HTDIA: NO
EVENT NAME HTEVT: NORMAL
HYPERTENSION HTHYP: NO
SCREENING LOC CITY HTCIT: NORMAL
SCREENING LOC STATE HTSTA: NORMAL
SCREENING LOCATION HTLOC: NORMAL
SUBSCRIBER ID HTSID: NORMAL

## 2018-09-09 LAB
CHOLEST SERPL-MCNC: 213 MG/DL (ref 100–199)
FASTING STATUS PATIENT QL REPORTED: NORMAL
GLUCOSE SERPL-MCNC: 93 MG/DL (ref 65–99)
HDLC SERPL-MCNC: 66 MG/DL
LDLC SERPL CALC-MCNC: 107 MG/DL
TRIGL SERPL-MCNC: 202 MG/DL (ref 0–149)

## 2019-06-19 ENCOUNTER — OFFICE VISIT (OUTPATIENT)
Dept: MEDICAL GROUP | Facility: PHYSICIAN GROUP | Age: 54
End: 2019-06-19
Payer: COMMERCIAL

## 2019-06-19 VITALS
SYSTOLIC BLOOD PRESSURE: 130 MMHG | BODY MASS INDEX: 29.7 KG/M2 | WEIGHT: 196 LBS | DIASTOLIC BLOOD PRESSURE: 88 MMHG | OXYGEN SATURATION: 97 % | HEART RATE: 71 BPM | TEMPERATURE: 97.8 F | HEIGHT: 68 IN | RESPIRATION RATE: 16 BRPM

## 2019-06-19 DIAGNOSIS — M54.2 NECK PAIN: ICD-10-CM

## 2019-06-19 DIAGNOSIS — M48.02 CERVICAL SPINAL STENOSIS: ICD-10-CM

## 2019-06-19 PROCEDURE — 99214 OFFICE O/P EST MOD 30 MIN: CPT | Performed by: INTERNAL MEDICINE

## 2019-06-19 RX ORDER — GABAPENTIN 100 MG/1
100 CAPSULE ORAL
Qty: 90 CAP | Refills: 0 | Status: SHIPPED | OUTPATIENT
Start: 2019-06-19 | End: 2019-10-10

## 2019-06-19 ASSESSMENT — PATIENT HEALTH QUESTIONNAIRE - PHQ9: CLINICAL INTERPRETATION OF PHQ2 SCORE: 0

## 2019-06-19 NOTE — PROGRESS NOTES
PRIMARY CARE CLINIC FOLLOW UP VISIT  Chief Complaint   Patient presents with   • Neck Pain     radiating down to right shoulder & arm x 2 weeks     History of Present Illness     Neck pain  Having pain down his shoulder down into his arm, has a pins and needles sensation. Saw his chiropractor for 3 sessions with no relief. Symptoms started 2 weeks ago. Woke up one morning with right shoulder soreness but symptoms progressed. He is able to grasp with his right hand but trying to lift objects causes shooting pain down his arm. Hasn't had any imaging thus far. Icing his shoulder improves his pain. Denies neck pain. Tilting his head back reproduces the pain, shooting down his arm. Moving his arm a certain way also causes shooting pain down into his hand. Starts out with soreness in the morning and symptoms get worse through the day. 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     Current Outpatient Prescriptions   Medication Sig Dispense Refill   • gabapentin (NEURONTIN) 100 MG Cap Take 1 Cap by mouth every bedtime. 90 Cap 0   • fenofibrate (TRIGLIDE) 160 MG tablet Take 1 Tab by mouth every day. (Patient not taking: Reported on 6/19/2019) 90 Tab 1     No current facility-administered medications for this visit.      Past Medical History:   Diagnosis Date   • Heart burn    • High cholesterol    • Insomnia 7/6/2018     Past Surgical History:   Procedure Laterality Date   • INGUINAL HERNIA REPAIR BILATERAL Bilateral 9/20/2017    Procedure: INGUINAL HERNIA REPAIR BILATERAL WITH MESH  ;  Surgeon: Ed Ro M.D.;  Location: SURGERY [facility];  Service: General   • OTHER ORTHOPEDIC SURGERY  2009    ORIF left wrist   • SEPTOTURBINOPLASTY  6/4/08    Performed by ED TIRADO at SURGERY SAME DAY [facility] ORS   • OTHER ORTHOPEDIC SURGERY  2007    right knee scope     Social History  "  Substance Use Topics   • Smoking status: Never Smoker   • Smokeless tobacco: Never Used   • Alcohol use 8.4 oz/week     14 Cans of beer per week     Social History     Social History Narrative    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    ROS  As per HPI above. All other systems reviewed and negative.        Objective   /88   Pulse 71   Temp 36.6 °C (97.8 °F) (Temporal)   Resp 16   Ht 1.727 m (5' 8\")   Wt 88.9 kg (196 lb)   SpO2 97%  Body mass index is 29.8 kg/m².    General: alert and oriented, pleasant, cooperative  HEENT: Normocephalic, atraumatic.   MSK: positive bilateral Splurling's sign   Psychiatric: appropriate mood and affect. Good insight and appropriate judgment     Assessment and Plan   The following treatment plan was discussed     1. Cervical spinal stenosis  Has a positive spurling's sign bilaterally concerning for cervical stenosis. His work activity is likely exacerbating his symptoms. Start PT, trial of gabapentin at night and check MRI of the cervical spine.   - MR-CERVICAL SPINE-W/O; Future  - REFERRAL TO PHYSICAL THERAPY Reason for Therapy: Eval/Treat/Report  - gabapentin (NEURONTIN) 100 MG Cap; Take 1 Cap by mouth every bedtime.  Dispense: 90 Cap; Refill: 0    Healthcare maintenance     There are no preventive care reminders to display for this patient.    No Follow-up on file.    Kartik Mario MD  Internal Medicine  [facility]                   "

## 2019-06-19 NOTE — ASSESSMENT & PLAN NOTE
Having pain down his shoulder down into his arm, has a pins and needles sensation. Saw his chiropractor for 3 sessions with no relief. Symptoms started 2 weeks ago. Woke up one morning with right shoulder soreness but symptoms progressed. He is able to grasp with his right hand but trying to lift objects causes shooting pain down his arm. Hasn't had any imaging thus far. Icing his shoulder improves his pain. Denies neck pain. Tilting his head back reproduces the pain, shooting down his arm. Moving his arm a certain way also causes shooting pain down into his hand. Starts out with soreness in the morning and symptoms get worse through the day. 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

## 2019-06-22 ENCOUNTER — HOSPITAL ENCOUNTER (OUTPATIENT)
Dept: RADIOLOGY | Facility: MEDICAL CENTER | Age: 54
End: 2019-06-22
Attending: INTERNAL MEDICINE
Payer: COMMERCIAL

## 2019-06-22 DIAGNOSIS — M48.02 CERVICAL SPINAL STENOSIS: ICD-10-CM

## 2019-06-22 PROCEDURE — 72141 MRI NECK SPINE W/O DYE: CPT

## 2019-08-06 ENCOUNTER — PHYSICAL THERAPY (OUTPATIENT)
Dept: PHYSICAL THERAPY | Facility: REHABILITATION | Age: 54
End: 2019-08-06
Attending: INTERNAL MEDICINE
Payer: COMMERCIAL

## 2019-08-06 ENCOUNTER — TELEPHONE (OUTPATIENT)
Dept: MEDICAL GROUP | Facility: PHYSICIAN GROUP | Age: 54
End: 2019-08-06

## 2019-08-06 DIAGNOSIS — M54.2 NECK PAIN: ICD-10-CM

## 2019-08-06 DIAGNOSIS — M48.02 CERVICAL SPINAL STENOSIS: ICD-10-CM

## 2019-08-06 PROCEDURE — 97161 PT EVAL LOW COMPLEX 20 MIN: CPT

## 2019-08-06 PROCEDURE — 97110 THERAPEUTIC EXERCISES: CPT

## 2019-08-06 SDOH — ECONOMIC STABILITY: GENERAL: QUALITY OF LIFE: FAIR

## 2019-08-06 ASSESSMENT — ENCOUNTER SYMPTOMS
PAIN SCALE AT HIGHEST: 10
PAIN SCALE: 0
PAIN SCALE AT LOWEST: 0

## 2019-08-06 NOTE — TELEPHONE ENCOUNTER
----- Message from Ru Berry, PT, DPT sent at 8/6/2019  1:27 PM PDT -----  Regarding: Need for PMR  Hi Dr. Mario,    This patient did not respond well to mechanical testing for his neck symptoms. Since his symptoms are improving I recommend we get him over to Dr. Guzman ([facility]) or a PMR of your choice for further work up. I think this would be the best place due to resolving symptoms and no signs of progressive loss of muscle function or reflex when compared to the non involved side.      If you would like to speak re: this patient please call at any time.    Christo    [phone]

## 2019-08-06 NOTE — OP THERAPY EVALUATION
Outpatient Physical Therapy  INITIAL EVALUATION    [facility] [place]  [address], [address]  [place] [address]  Phone:  [phone]  Fax:  [phone]    Date of Evaluation: 2019    Patient: Ki Melo  YOB: 1965  MRN: [id number]     Referring Provider: Kartik Mario M.D.  [address] [place] [address] [facility] [place] [address]   Referring Diagnosis Cervical spinal stenosis [M48.02]     Time Calculation  Start time: 930  Stop time:  Time Calculation (min): 60 minutes       Physical Therapy Occurrence Codes    Date of onset of impairment:  19   Date physical therapy care plan established or reviewed:  19   Date physical therapy treatment started:  19          Chief Complaint: Neck Problem    Visit Diagnoses     ICD-10-CM   1. Cervical spinal stenosis M48.02         Subjective:   History of Present Illness:     Date of onset:  2019  Quality of life:  Fair  Prior level of function:  Ongoing symptoms for about 1 year  Pain:     Current pain ratin    At best pain ratin    At worst pain rating:  10  Diagnostic Tests:     MRI studies: abnormal    Activities of Daily Living:     Patient reported ADL status: Limited with reaching above head  Limited object manipulation above head  Patient Goals:     Patient goals for therapy:  Increased motion, decreased pain and increased strength    Patient is a 53 y.o. male that presents to therapy with R arm pain. States that symptoms were insidious in onset. Reports the pain quality to be sharp/dull, constant and are primarily . Reports that symptoms now getting better. States that aggravating factors are overhead motion, lifting, driving. States that easng factors are reaching behind head, [employer]. Denies red flags.   Past Medical History:   Diagnosis Date   • Heart burn    • High cholesterol    • Insomnia 2018     Past Surgical History:   Procedure Laterality Date   • INGUINAL HERNIA REPAIR BILATERAL  Bilateral 9/20/2017    Procedure: INGUINAL HERNIA REPAIR BILATERAL WITH MESH  ;  Surgeon: Ed Ro M.D.;  Location: SURGERY [facility];  Service: General   • OTHER ORTHOPEDIC SURGERY  2009    ORIF left wrist   • SEPTOTURBINOPLASTY  6/4/08    Performed by ED TIRADO at SURGERY SAME DAY [facility] ORS   • OTHER ORTHOPEDIC SURGERY  2007    right knee scope     Social History     Tobacco Use   • Smoking status: Never Smoker   • Smokeless tobacco: Never Used   Substance Use Topics   • Alcohol use: Yes     Alcohol/week: 8.4 oz     Types: 14 Cans of beer per week     Family and Occupational History     Socioeconomic History   • Marital status:      Spouse name: Not on file   • Number of children: Not on file   • Years of education: Not on file   • Highest education level: Not on file   Occupational History   • Not on file       Objective     Neurological Testing     Reflexes   Left   Biceps (C5/C6): normal (2+)  Triceps (C7): trace (1+)  Ankle clonus reflex: negative  Babinski sign: negative  Diego's reflex: negative    Right   Biceps (C5/C6): normal (2+)  Triceps (C7): trace (1+)  Ankle clonus reflex: negative  Babinski sign: negative  Diego's reflex: negative    Myotome testing   Cervical (left)   C5 (deltoid): 5  C6 (biceps): 4  C7 (triceps): 4  C8 (thumb extension): 5  T1 (intrinsics): 5    Cervical (right)   C5 (deltoid): 5  C6 (biceps): 4  C7 (triceps): 4+  C8 (thumb extension): 5  T1 (intrinsics): 5    Dermatome testing   Cervical (left)   All left cervical dermatomes intact    Cervical (right)   All right cervical dermatomes intact    Palpation   Left   Hypertonic in the cervical paraspinals, levator scapulae and upper trapezius.     Right   Hypertonic in the cervical paraspinals, levator scapulae and upper trapezius.     Active Range of Motion     Cervical Spine   Flexion: Active cervical flexion: 40deg.  Extension: Active cervical extension: 42deg P.  Retraction: decreased  Left lateral  flexion: Active left cervical lateral flexion: 25deg stretch.  Right lateral flexion: Active right cervical lateral flexion: 20deg symptoms.  Left rotation: Active left cervical rotation: 45deg.  Right rotation: Active right cervical rotation: 42deg.    Tests   Cervical spine     Left Spine   Negative Sharp-Penny test and vertebral artery test.     Right spine   Negative Sharp-Penny test and vertebral artery test.     Left Shoulder   Negative ULTT2, ULTT3 and ULTT4.     Right Shoulder   Positive ULTT2 and ULTT4.   Negative ULTT3.     Naseem Cervical Test     Lying repeated motions:   Retraction in lying     Symptoms during testing: produces     Symptoms after testing: no worse  Repeat retraction in lying     Symptoms during testing: produces    Symptoms after testing: no worse    Sitting repeated motions:   Retraction in sitting     Symptoms during testing: produces    Symptoms after testing: no worse  Repeat retraction in sitting     Symptoms during testing: produces    Symptoms after testing: no worse  Retraction with extension in sitting     Symptoms during testing: produces    Symptoms after testing: worse        Therapeutic Exercises (CPT 80654):     1. Supine chin tuck, x5-10 every 3-4 hours D/C if symptoms increase    2. Postural edu, x5min      Time-based treatments/modalities:  Therapeutic exercise minutes (CPT 47428): 10 minutes       Assessment, Response and Plan:   Impairments: abnormal or restricted ROM, impaired physical strength and pain with function    Assessment details:  Patient presents with signs and symptoms consistent with a mechanical unresponsive radiculopathy vs dernagment. Patient limitations include weakness, decreased ROM, and pain. Patient demonstrated no change in symptoms with motion testing. Recommend patient trial therapy to determine if symptoms will improve. If no improvement recommend referral to PMR.   Prognosis: fair    Goals:   Short Term Goals:   1) Patient's cervical ext  will improve by 10deg to facilitate improved ability to do work overhead.  2) Patient's symtpoms will improve to facilitate driving >20min without an increase in symptoms.  Short term goal time span:  2-4 weeks      Long Term Goals:    1) Patient's symptoms will improve to allow for overhead work without symptom increase.  2) Patient's NDI will improve to 0 to demonstrate functional improvement  Long term goal time span:  6-8 weeks    Plan:   Therapy options:  Physical therapy treatment to continue  Planned therapy interventions:  E Stim Unattended (CPT 21737), Hot or Cold Pack Therapy (CPT 73863), Manual Therapy (CPT 39103), Neuromuscular Re-education (CPT 52265) and Therapeutic Exercise (CPT 59129)  Frequency:  2x week  Duration in weeks:  6  Discussed with:  Patient      Functional Assessment Used  PT Functional Assessment Tool Used: NDI  PT Functional Assessment Score: 4     Referring provider co-signature:  I have reviewed this plan of care and my co-signature certifies the need for services.  Certification Dates:   From 08/06/19   To 09/17/19    Physician Signature: ________________________________ Date: ______________

## 2019-08-09 ENCOUNTER — APPOINTMENT (OUTPATIENT)
Dept: PHYSICAL THERAPY | Facility: REHABILITATION | Age: 54
End: 2019-08-09
Attending: INTERNAL MEDICINE
Payer: COMMERCIAL

## 2019-08-14 ENCOUNTER — TELEPHONE (OUTPATIENT)
Dept: PHYSICAL THERAPY | Facility: REHABILITATION | Age: 54
End: 2019-08-14

## 2019-08-14 NOTE — OP THERAPY DISCHARGE SUMMARY
Outpatient Physical Therapy  DISCHARGE SUMMARY NOTE      [facility] [place]  [address], [address]  [place] [address]  Phone:  [phone]  Fax:  [phone]    Date of Visit: 08/14/2019    Patient: Ki Melo  YOB: 1965  MRN: [id number]     Referring Provider: Kartik Mario M.D.   Referring Diagnosis Cervical spinal stenosis [M48.02]     Physical Therapy Occurrence Codes    Date of onset of impairment:  6/6/19   Date physical therapy care plan established or reviewed:  8/6/19   Date physical therapy treatment started:  8/6/19          Your patient is being discharged from Physical Therapy with the following comments:   · Patient to follow up with specialist     Comments:  Discharge    Limitations Remaining:  See eval    Recommendations:  Discharge per request. Patient to follow up with specialist.     Ru Berry, PT, DPT    Date: 8/14/2019

## 2019-08-16 ENCOUNTER — APPOINTMENT (OUTPATIENT)
Dept: PHYSICAL THERAPY | Facility: REHABILITATION | Age: 54
End: 2019-08-16
Attending: INTERNAL MEDICINE
Payer: COMMERCIAL

## 2019-08-21 ENCOUNTER — APPOINTMENT (OUTPATIENT)
Dept: PHYSICAL THERAPY | Facility: REHABILITATION | Age: 54
End: 2019-08-21
Attending: INTERNAL MEDICINE
Payer: COMMERCIAL

## 2019-08-23 ENCOUNTER — APPOINTMENT (OUTPATIENT)
Dept: PHYSICAL THERAPY | Facility: REHABILITATION | Age: 54
End: 2019-08-23
Attending: INTERNAL MEDICINE
Payer: COMMERCIAL

## 2019-08-28 ENCOUNTER — APPOINTMENT (OUTPATIENT)
Dept: PHYSICAL THERAPY | Facility: REHABILITATION | Age: 54
End: 2019-08-28
Attending: INTERNAL MEDICINE
Payer: COMMERCIAL

## 2019-08-30 ENCOUNTER — APPOINTMENT (OUTPATIENT)
Dept: PHYSICAL THERAPY | Facility: REHABILITATION | Age: 54
End: 2019-08-30
Attending: INTERNAL MEDICINE
Payer: COMMERCIAL

## 2019-09-04 ENCOUNTER — APPOINTMENT (OUTPATIENT)
Dept: PHYSICAL THERAPY | Facility: REHABILITATION | Age: 54
End: 2019-09-04
Attending: INTERNAL MEDICINE
Payer: COMMERCIAL

## 2019-09-06 ENCOUNTER — APPOINTMENT (OUTPATIENT)
Dept: PHYSICAL THERAPY | Facility: REHABILITATION | Age: 54
End: 2019-09-06
Attending: INTERNAL MEDICINE
Payer: COMMERCIAL

## 2019-10-10 ENCOUNTER — HOSPITAL ENCOUNTER (OUTPATIENT)
Dept: RADIOLOGY | Facility: REHABILITATION | Age: 54
End: 2019-10-10
Attending: PHYSICAL MEDICINE & REHABILITATION

## 2019-10-10 ENCOUNTER — HOSPITAL ENCOUNTER (OUTPATIENT)
Dept: PAIN MANAGEMENT | Facility: REHABILITATION | Age: 54
End: 2019-10-10
Attending: PHYSICAL MEDICINE & REHABILITATION
Payer: COMMERCIAL

## 2019-10-10 VITALS
RESPIRATION RATE: 14 BRPM | OXYGEN SATURATION: 95 % | DIASTOLIC BLOOD PRESSURE: 98 MMHG | SYSTOLIC BLOOD PRESSURE: 142 MMHG | TEMPERATURE: 96.8 F | HEART RATE: 71 BPM | BODY MASS INDEX: 28.8 KG/M2 | HEIGHT: 68 IN | WEIGHT: 190.04 LBS

## 2019-10-10 PROCEDURE — 700117 HCHG RX CONTRAST REV CODE 255

## 2019-10-10 PROCEDURE — 99152 MOD SED SAME PHYS/QHP 5/>YRS: CPT

## 2019-10-10 PROCEDURE — 700111 HCHG RX REV CODE 636 W/ 250 OVERRIDE (IP)

## 2019-10-10 PROCEDURE — 62321 NJX INTERLAMINAR CRV/THRC: CPT

## 2019-10-10 RX ORDER — MIDAZOLAM HYDROCHLORIDE 1 MG/ML
INJECTION INTRAMUSCULAR; INTRAVENOUS
Status: COMPLETED
Start: 2019-10-10 | End: 2019-10-10

## 2019-10-10 RX ORDER — DEXAMETHASONE SODIUM PHOSPHATE 10 MG/ML
INJECTION, SOLUTION INTRAMUSCULAR; INTRAVENOUS
Status: COMPLETED
Start: 2019-10-10 | End: 2019-10-10

## 2019-10-10 RX ORDER — LIDOCAINE HYDROCHLORIDE 10 MG/ML
INJECTION, SOLUTION EPIDURAL; INFILTRATION; INTRACAUDAL; PERINEURAL
Status: COMPLETED
Start: 2019-10-10 | End: 2019-10-10

## 2019-10-10 RX ADMIN — MIDAZOLAM HYDROCHLORIDE 1 MG: 1 INJECTION, SOLUTION INTRAMUSCULAR; INTRAVENOUS at 11:26

## 2019-10-10 RX ADMIN — IOHEXOL 1 ML: 240 INJECTION, SOLUTION INTRATHECAL; INTRAVASCULAR; INTRAVENOUS; ORAL at 11:33

## 2019-10-10 RX ADMIN — FENTANYL CITRATE 50 MCG: 50 INJECTION, SOLUTION INTRAMUSCULAR; INTRAVENOUS at 11:26

## 2019-10-10 RX ADMIN — DEXAMETHASONE SODIUM PHOSPHATE 10 MG: 10 INJECTION, SOLUTION INTRAMUSCULAR; INTRAVENOUS at 11:36

## 2019-10-10 RX ADMIN — LIDOCAINE HYDROCHLORIDE 5 ML: 10 INJECTION, SOLUTION EPIDURAL; INFILTRATION; INTRACAUDAL; PERINEURAL at 11:30

## 2019-10-10 NOTE — NON-PROVIDER
Patient identified,procedure confirmed, medication allergies, pertinent medical history ( sleep apnea )Site marked by Dr. Rodriguez . Positioned patient by RN,CNA & X-ray Tech. Tucked both hands  under both hips, feet and lower extremities placed pillow for support.  Tolerated procedure well. Reflexes remains intact. Post - procedure confirmed. Accompanied to recovery room, ambulatory.

## 2019-10-10 NOTE — NON-PROVIDER
Current meds. See medication reconciliation form. Reviewed with pt. Pt denies taking ASA,other blood thinners or anti-inflammatories. Pt has a ride post-procedure (wife, Yoana is ). Printed and verbal discharge instructions given to pt who verbalized understanding.

## 2019-10-11 NOTE — PROCEDURES
DATE OF SERVICE:  10/10/2019    NAME OF PROCEDURE:  1.  C7-T1 intralaminar epidural steroid injection with 10 mg of dexamethasone   under fluoroscopic guidance.  2.  IV conscious sedation to improve safety by improving tolerance and   decreasing unwanted movement.    INDICATION:  The patient is a 54-year-old patient of [facility].    He has neck pain felt to be due to the cervical disks, so Dr. Guzman has   recommended a cervical epidural.    DESCRIPTION OF PROCEDURE:  After appropriate informed consent was obtained,   the patient was placed prone on the table.  He received IV conscious sedation   in the form of 1 mL equal to 1 mg of midazolam along with 1 mL equal to 50 mcg   of fentanyl throughout the course of the procedure.    The patient's vital signs including blood pressure, pulse, and pulse oximetry   were monitored throughout the procedure by a trained staff member and these   remained in acceptable ranges.    In addition, patient remained conscious throughout the procedure, able to   report on his status, but relaxed with no unwanted movement.    A 3.5-inch 20-gauge Tuohy catheter was directed under intermittent   fluoroscopic guidance at the lamina.  Once the lamina was detected, the   catheter was directed cephalad medially and loss of resistance technique was   used to determine the epidural space.    EPIDUROGRAM:  1 mL of Omnipaque was placed just left of midline at C7-T1.    This was viewed on the AP and contralateral oblique showing perfect epidural   spread pattern.    Dexamethasone 10 mg was placed at the C7 level.    The patient tolerated the procedure without any obvious complications.  He was   referred to the recovery area, at which point he was doing well with no   complications.    He will follow up with Dr. Guzman in the next few weeks to monitor response to   injections.    NOTE:  Permanent images have been recorded.       ____________________________________     GOLDIE B.  MD PATRICIA    FBRADHA / NTS    DD:  10/10/2019 11:46:47  DT:  10/11/2019 01:01:48    D#:  [id number]  Job#:  [id number]    cc: Kartik Mario MD, Ellie PARHAMT

## 2022-01-31 ENCOUNTER — OFFICE VISIT (OUTPATIENT)
Dept: URGENT CARE | Facility: PHYSICIAN GROUP | Age: 57
End: 2022-01-31
Payer: COMMERCIAL

## 2022-01-31 VITALS
SYSTOLIC BLOOD PRESSURE: 150 MMHG | HEART RATE: 82 BPM | TEMPERATURE: 97.8 F | OXYGEN SATURATION: 99 % | RESPIRATION RATE: 16 BRPM | DIASTOLIC BLOOD PRESSURE: 88 MMHG | BODY MASS INDEX: 28.79 KG/M2 | HEIGHT: 68 IN | WEIGHT: 190 LBS

## 2022-01-31 DIAGNOSIS — I10 ESSENTIAL HYPERTENSION: ICD-10-CM

## 2022-01-31 PROCEDURE — 99214 OFFICE O/P EST MOD 30 MIN: CPT | Performed by: FAMILY MEDICINE

## 2022-01-31 RX ORDER — LISINOPRIL 10 MG/1
10 TABLET ORAL DAILY
Qty: 30 TABLET | Refills: 2 | Status: SHIPPED | OUTPATIENT
Start: 2022-01-31 | End: 2022-03-30 | Stop reason: SDUPTHER

## 2022-01-31 ASSESSMENT — ENCOUNTER SYMPTOMS
VOMITING: 0
FEVER: 0

## 2022-01-31 NOTE — PROGRESS NOTES
"Subjective:     Ki Melo is a 56 y.o. male who presents for Hypertension (x 1 week and states he has been having headaches)    HPI  Pt presents for evaluation of an acute problem  Pt with headaches the past week   Has been taking BP at home and up to 150/100  Has not had any readings under 140's systolic   Tried cutting down caffeine and also taking garlic pills   No blurry vision   Has hx of mildly elevated blood pressures up to 130's, but has never been diagnosed with hypertension or been on medications for it     Review of Systems   Constitutional: Negative for fever.   Gastrointestinal: Negative for vomiting.   Skin: Negative for rash.     PMH:  has a past medical history of Heart burn, High cholesterol, and Insomnia (7/6/2018).  MEDS: No current outpatient medications on file.  ALLERGIES: No Known Allergies  SURGHX:   Past Surgical History:   Procedure Laterality Date   • INGUINAL HERNIA REPAIR BILATERAL Bilateral 9/20/2017    Procedure: INGUINAL HERNIA REPAIR BILATERAL WITH MESH  ;  Surgeon: Ed Ro M.D.;  Location: SURGERY [facility];  Service: General   • OTHER ORTHOPEDIC SURGERY  2009    ORIF left wrist   • SEPTOTURBINOPLASTY  6/4/08    Performed by ED TIRADO at SURGERY SAME DAY [facility] ORS   • OTHER ORTHOPEDIC SURGERY  2007    right knee scope     SOCHX:  reports that he has never smoked. He has never used smokeless tobacco. He reports current alcohol use of about 8.4 oz of alcohol per week. He reports that he does not use drugs.     Objective:   /88 (BP Location: Left arm, Patient Position: Sitting, BP Cuff Size: Large adult)   Pulse 82   Temp 36.6 °C (97.8 °F) (Temporal)   Resp 16   Ht 1.727 m (5' 8\")   Wt 86.2 kg (190 lb)   SpO2 99%   BMI 28.89 kg/m²     Physical Exam  Constitutional:       General: He is not in acute distress.     Appearance: He is well-developed. He is not diaphoretic.   HENT:      Head: Normocephalic and atraumatic.   Neck:      Trachea: No " tracheal deviation.   Cardiovascular:      Rate and Rhythm: Normal rate and regular rhythm.      Heart sounds: Normal heart sounds.      Comments: No carotid bruits  Pulmonary:      Effort: Pulmonary effort is normal. No respiratory distress.      Breath sounds: Normal breath sounds. No wheezing or rales.   Musculoskeletal:         General: Normal range of motion.   Skin:     General: Skin is warm and dry.      Findings: No rash.   Neurological:      Mental Status: He is alert.   Psychiatric:         Behavior: Behavior normal.         Thought Content: Thought content normal.         Judgment: Judgment normal.         Assessment/Plan:   Assessment    1. Essential hypertension  - CBC WITH DIFFERENTIAL; Future  - Comp Metabolic Panel; Future  - Lipid Profile; Future  - lisinopril (PRINIVIL) 10 MG Tab; Take 1 Tablet by mouth every day.  Dispense: 30 Tablet; Refill: 2    Patient with essential hypertension.  Blood pressure in the 150s systolic in both arms today.  Advised that he should be placed on antihypertensive medications since this has been elevated for quite a long time and getting a little worse lately.  Patient agreeable and wanting to start medications.  Will start 10 mg lisinopril daily and check labs.  He has been referred to establish with primary care for further follow-up and management.

## 2022-02-02 ENCOUNTER — TELEPHONE (OUTPATIENT)
Dept: SPORTS MEDICINE | Facility: CLINIC | Age: 57
End: 2022-02-02

## 2022-02-03 ENCOUNTER — TELEPHONE (OUTPATIENT)
Dept: SCHEDULING | Facility: IMAGING CENTER | Age: 57
End: 2022-02-03

## 2022-02-03 NOTE — TELEPHONE ENCOUNTER
I got a call from the patient's [facility] in regards to a medication refill, Lisinopril. I called the patient to see if he needed the medication refilled and/or checked the status of the referral to PCP. Per the patient, he was seen recently in urgent care and a referral was placed for primary, but the patient has not received a call yet.  I provided the contact number for [facility] scheduling and updated the [facility].    Chloe

## 2022-03-02 ENCOUNTER — OFFICE VISIT (OUTPATIENT)
Dept: MEDICAL GROUP | Facility: PHYSICIAN GROUP | Age: 57
End: 2022-03-02
Payer: COMMERCIAL

## 2022-03-02 VITALS
WEIGHT: 193 LBS | HEIGHT: 68 IN | SYSTOLIC BLOOD PRESSURE: 118 MMHG | OXYGEN SATURATION: 98 % | HEART RATE: 71 BPM | TEMPERATURE: 98.2 F | BODY MASS INDEX: 29.25 KG/M2 | DIASTOLIC BLOOD PRESSURE: 78 MMHG | RESPIRATION RATE: 16 BRPM

## 2022-03-02 DIAGNOSIS — Z12.12 SCREENING FOR COLORECTAL CANCER: ICD-10-CM

## 2022-03-02 DIAGNOSIS — Z76.89 ENCOUNTER TO ESTABLISH CARE: ICD-10-CM

## 2022-03-02 DIAGNOSIS — E78.00 HIGH CHOLESTEROL: ICD-10-CM

## 2022-03-02 DIAGNOSIS — F51.01 PRIMARY INSOMNIA: ICD-10-CM

## 2022-03-02 DIAGNOSIS — Z12.11 SCREENING FOR COLORECTAL CANCER: ICD-10-CM

## 2022-03-02 DIAGNOSIS — I10 PRIMARY HYPERTENSION: ICD-10-CM

## 2022-03-02 PROBLEM — M54.2 NECK PAIN: Status: RESOLVED | Noted: 2019-06-19 | Resolved: 2022-03-02

## 2022-03-02 PROCEDURE — 99214 OFFICE O/P EST MOD 30 MIN: CPT | Performed by: NURSE PRACTITIONER

## 2022-03-02 SDOH — HEALTH STABILITY: MENTAL HEALTH
STRESS IS WHEN SOMEONE FEELS TENSE, NERVOUS, ANXIOUS, OR CAN'T SLEEP AT NIGHT BECAUSE THEIR MIND IS TROUBLED. HOW STRESSED ARE YOU?: NOT AT ALL

## 2022-03-02 SDOH — ECONOMIC STABILITY: TRANSPORTATION INSECURITY
IN THE PAST 12 MONTHS, HAS THE LACK OF TRANSPORTATION KEPT YOU FROM MEDICAL APPOINTMENTS OR FROM GETTING MEDICATIONS?: NO

## 2022-03-02 SDOH — ECONOMIC STABILITY: INCOME INSECURITY: HOW HARD IS IT FOR YOU TO PAY FOR THE VERY BASICS LIKE FOOD, HOUSING, MEDICAL CARE, AND HEATING?: NOT VERY HARD

## 2022-03-02 SDOH — ECONOMIC STABILITY: HOUSING INSECURITY: IN THE LAST 12 MONTHS, HOW MANY PLACES HAVE YOU LIVED?: 1

## 2022-03-02 SDOH — HEALTH STABILITY: PHYSICAL HEALTH: ON AVERAGE, HOW MANY MINUTES DO YOU ENGAGE IN EXERCISE AT THIS LEVEL?: 50 MIN

## 2022-03-02 SDOH — ECONOMIC STABILITY: HOUSING INSECURITY
IN THE LAST 12 MONTHS, WAS THERE A TIME WHEN YOU DID NOT HAVE A STEADY PLACE TO SLEEP OR SLEPT IN A SHELTER (INCLUDING NOW)?: NO

## 2022-03-02 SDOH — ECONOMIC STABILITY: INCOME INSECURITY: IN THE LAST 12 MONTHS, WAS THERE A TIME WHEN YOU WERE NOT ABLE TO PAY THE MORTGAGE OR RENT ON TIME?: NO

## 2022-03-02 SDOH — ECONOMIC STABILITY: FOOD INSECURITY: WITHIN THE PAST 12 MONTHS, THE FOOD YOU BOUGHT JUST DIDN'T LAST AND YOU DIDN'T HAVE MONEY TO GET MORE.: NEVER TRUE

## 2022-03-02 SDOH — ECONOMIC STABILITY: FOOD INSECURITY: WITHIN THE PAST 12 MONTHS, YOU WORRIED THAT YOUR FOOD WOULD RUN OUT BEFORE YOU GOT MONEY TO BUY MORE.: NEVER TRUE

## 2022-03-02 SDOH — HEALTH STABILITY: PHYSICAL HEALTH: ON AVERAGE, HOW MANY DAYS PER WEEK DO YOU ENGAGE IN MODERATE TO STRENUOUS EXERCISE (LIKE A BRISK WALK)?: 2 DAYS

## 2022-03-02 SDOH — ECONOMIC STABILITY: TRANSPORTATION INSECURITY
IN THE PAST 12 MONTHS, HAS LACK OF TRANSPORTATION KEPT YOU FROM MEETINGS, WORK, OR FROM GETTING THINGS NEEDED FOR DAILY LIVING?: NO

## 2022-03-02 SDOH — ECONOMIC STABILITY: TRANSPORTATION INSECURITY
IN THE PAST 12 MONTHS, HAS LACK OF RELIABLE TRANSPORTATION KEPT YOU FROM MEDICAL APPOINTMENTS, MEETINGS, WORK OR FROM GETTING THINGS NEEDED FOR DAILY LIVING?: NO

## 2022-03-02 ASSESSMENT — SOCIAL DETERMINANTS OF HEALTH (SDOH)
HOW OFTEN DO YOU ATTENT MEETINGS OF THE CLUB OR ORGANIZATION YOU BELONG TO?: NEVER
HOW OFTEN DO YOU HAVE A DRINK CONTAINING ALCOHOL: 2-3 TIMES A WEEK
HOW OFTEN DO YOU ATTEND CHURCH OR RELIGIOUS SERVICES?: NEVER
IN A TYPICAL WEEK, HOW MANY TIMES DO YOU TALK ON THE PHONE WITH FAMILY, FRIENDS, OR NEIGHBORS?: MORE THAN THREE TIMES A WEEK
HOW OFTEN DO YOU ATTENT MEETINGS OF THE CLUB OR ORGANIZATION YOU BELONG TO?: NEVER
DO YOU BELONG TO ANY CLUBS OR ORGANIZATIONS SUCH AS CHURCH GROUPS UNIONS, FRATERNAL OR ATHLETIC GROUPS, OR SCHOOL GROUPS?: NO
HOW OFTEN DO YOU GET TOGETHER WITH FRIENDS OR RELATIVES?: ONCE A WEEK
HOW HARD IS IT FOR YOU TO PAY FOR THE VERY BASICS LIKE FOOD, HOUSING, MEDICAL CARE, AND HEATING?: NOT VERY HARD
HOW OFTEN DO YOU GET TOGETHER WITH FRIENDS OR RELATIVES?: ONCE A WEEK
IN A TYPICAL WEEK, HOW MANY TIMES DO YOU TALK ON THE PHONE WITH FAMILY, FRIENDS, OR NEIGHBORS?: MORE THAN THREE TIMES A WEEK
HOW OFTEN DO YOU HAVE SIX OR MORE DRINKS ON ONE OCCASION: NEVER
WITHIN THE PAST 12 MONTHS, YOU WORRIED THAT YOUR FOOD WOULD RUN OUT BEFORE YOU GOT THE MONEY TO BUY MORE: NEVER TRUE
HOW OFTEN DO YOU ATTEND CHURCH OR RELIGIOUS SERVICES?: NEVER
DO YOU BELONG TO ANY CLUBS OR ORGANIZATIONS SUCH AS CHURCH GROUPS UNIONS, FRATERNAL OR ATHLETIC GROUPS, OR SCHOOL GROUPS?: NO
HOW MANY DRINKS CONTAINING ALCOHOL DO YOU HAVE ON A TYPICAL DAY WHEN YOU ARE DRINKING: 3 OR 4

## 2022-03-02 ASSESSMENT — LIFESTYLE VARIABLES
HOW OFTEN DO YOU HAVE SIX OR MORE DRINKS ON ONE OCCASION: NEVER
HOW OFTEN DO YOU HAVE A DRINK CONTAINING ALCOHOL: 2-3 TIMES A WEEK
HOW MANY STANDARD DRINKS CONTAINING ALCOHOL DO YOU HAVE ON A TYPICAL DAY: 3 OR 4

## 2022-03-02 ASSESSMENT — PATIENT HEALTH QUESTIONNAIRE - PHQ9: CLINICAL INTERPRETATION OF PHQ2 SCORE: 0

## 2022-03-02 NOTE — ASSESSMENT & PLAN NOTE
His blood pressure is at goal today. He is taking Lisinopril 10 mg daily. He inform urgent care that his blood pressure at home had been greater than 140 systolically and as high as 150/100. His blood pressure has been 140-150/80's. At home. He states that these blood pressure readings are taken 15 minutes after he takes his blood pressure medication. He does sit and wait at least 5 minutes before taking his home blood pressure. No headaches, blurry vision, dizziness, chest pain, or shortness of breath.

## 2022-03-02 NOTE — PROGRESS NOTES
Subjective:     CC:  Diagnoses of Encounter to establish care, Primary hypertension, High cholesterol, Primary insomnia, and Screening for colorectal cancer were pertinent to this visit.    HISTORY OF THE PRESENT ILLNESS: Patient is a 56 y.o. male. This pleasant patient is here today to establish care and discuss the following. His prior PCP was Dr. Mario.    Primary hypertension  His blood pressure is at goal today. He is taking Lisinopril 10 mg daily. He inform urgent care that his blood pressure at home had been greater than 140 systolically and as high as 150/100. His blood pressure has been 140-150/80's. At home. He states that these blood pressure readings are taken 15 minutes after he takes his blood pressure medication. He does sit and wait at least 5 minutes before taking his home blood pressure. No headaches, blurry vision, dizziness, chest pain, or shortness of breath.     Insomnia  He is taking melatonin that helps. He has having difficulty staying asleep.     High cholesterol  He does not take any cholesterol-lowering medication. He states that he gets annual labs with his employer. He does not remember his cholesterol results from last year. Last results on file:    Results for MALCOLM PHAM (MRN [id number]) as of 3/2/2022 08:35   Ref. Range 9/8/2018 08:19   Cholesterol,Tot Latest Ref Range: 100 - 199 mg/dL 213 (H)   Triglycerides Latest Ref Range: 0 - 149 mg/dL 202 (H)   HDL Latest Ref Range: >=40 mg/dL 66   LDL Latest Ref Range: <100 mg/dL 107 (H)       Allergies: Patient has no known allergies.    Current Outpatient Medications Ordered in Epic   Medication Sig Dispense Refill   • lisinopril (PRINIVIL) 10 MG Tab Take 1 Tablet by mouth every day. 30 Tablet 2     No current Epic-ordered facility-administered medications on file.       Past Medical History:   Diagnosis Date   • Heart burn    • High cholesterol    • Insomnia 7/6/2018       Past Surgical History:   Procedure Laterality Date   • INGUINAL  "HERNIA REPAIR BILATERAL Bilateral 9/20/2017    Procedure: INGUINAL HERNIA REPAIR BILATERAL WITH MESH  ;  Surgeon: Ed Ro M.D.;  Location: SURGERY [facility];  Service: General   • OTHER ORTHOPEDIC SURGERY  2009    ORIF left wrist   • SEPTOTURBINOPLASTY  6/4/08    Performed by ED TIRADO at SURGERY SAME DAY [facility] ORS   • OTHER ORTHOPEDIC SURGERY  2007    right knee scope       Social History     Tobacco Use   • Smoking status: Never Smoker   • Smokeless tobacco: Never Used   Vaping Use   • Vaping Use: Some days   Substance Use Topics   • Alcohol use: Yes     Alcohol/week: 3.6 oz     Types: 6 Cans of beer per week   • Drug use: Yes     Frequency: 2.0 times per week     Types: Marijuana, Inhaled     Comment: occasional       Social History     Social History Narrative    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 He reports that he had colonoscopy in 2017 and was told he had 5-year recall.      Objective:     Vital signs reviewed  Exam: /78 (BP Location: Right arm, Patient Position: Sitting, BP Cuff Size: Adult)   Pulse 71   Temp 36.8 °C (98.2 °F) (Temporal)   Resp 16   Ht 1.727 m (5' 8\")   Wt 87.5 kg (193 lb)   SpO2 98%  Body mass index is 29.35 kg/m².    Gen: Alert and oriented, No apparent distress.  Eyes:   Lids normal. Glasses in place.   Neck: Neck is supple without lymphadenopathy.  Lungs: Normal effort, CTA bilaterally, no wheezes, rhonchi, or rales.    CV: Regular rate and rhythm. No murmurs, rubs, or gallops.  Ext: No clubbing, cyanosis, edema      Assessment & Plan:   56 y.o. male with the following -    1. Encounter to establish care  Acute uncomplicated problem. Care established today.    2. Primary hypertension  Chronic stable " problem. His blood pressure is at goal today. He will continue this lisinopril 10 mg daily. I would like to see him back in 1 month. He does have medication available until next appointment. He has his labs scheduled for 3/12/2022. We discussed how to correctly take his blood pressure at home including waiting at least 2 hours after his blood pressure medication. Home blood pressure log provided today with instructions.  - CBC WITH DIFFERENTIAL; Future  - Comp Metabolic Panel; Future  - Lipid Profile; Future  - TSH WITH REFLEX TO FT4; Future    3. High cholesterol  Chronic stable problem. He is due for updated labs. He will return in 1 month for follow-up.    4. Primary insomnia  Chronic stable problem. He will continue his melatonin. No acute complaints today.    5. Screening for colorectal cancer  Chronic stable problem. Due for 5-year recall for his colonoscopy. Referral placed today.  - Referral to GI for Colonoscopy      Return in about 4 weeks (around 3/30/2022) for Hypertension, Labs.    Please note that this dictation was created using voice recognition software. I have made every reasonable attempt to correct obvious errors, but I expect that there are errors of grammar and possibly content that I did not discover before finalizing the note.

## 2022-03-02 NOTE — ASSESSMENT & PLAN NOTE
He does not take any cholesterol-lowering medication. He states that he gets annual labs with his employer. He does not remember his cholesterol results from last year. Last results on file:    Results for MALCOLM PHAM (MRN [id number]) as of 3/2/2022 08:35   Ref. Range 9/8/2018 08:19   Cholesterol,Tot Latest Ref Range: 100 - 199 mg/dL 213 (H)   Triglycerides Latest Ref Range: 0 - 149 mg/dL 202 (H)   HDL Latest Ref Range: >=40 mg/dL 66   LDL Latest Ref Range: <100 mg/dL 107 (H)

## 2022-03-04 ENCOUNTER — OFFICE VISIT (OUTPATIENT)
Dept: URGENT CARE | Facility: PHYSICIAN GROUP | Age: 57
End: 2022-03-04
Payer: COMMERCIAL

## 2022-03-04 VITALS
HEART RATE: 77 BPM | WEIGHT: 194 LBS | RESPIRATION RATE: 16 BRPM | SYSTOLIC BLOOD PRESSURE: 126 MMHG | HEIGHT: 68 IN | TEMPERATURE: 97.5 F | DIASTOLIC BLOOD PRESSURE: 82 MMHG | BODY MASS INDEX: 29.4 KG/M2 | OXYGEN SATURATION: 99 %

## 2022-03-04 DIAGNOSIS — L03.032 PARONYCHIA OF GREAT TOE OF LEFT FOOT: ICD-10-CM

## 2022-03-04 PROCEDURE — 10060 I&D ABSCESS SIMPLE/SINGLE: CPT | Performed by: PHYSICIAN ASSISTANT

## 2022-03-04 RX ORDER — CEPHALEXIN 500 MG/1
500 CAPSULE ORAL 4 TIMES DAILY
Qty: 28 CAPSULE | Refills: 0 | Status: SHIPPED | OUTPATIENT
Start: 2022-03-04 | End: 2022-03-11

## 2022-03-05 NOTE — PROCEDURES
I and D    Date/Time: 3/4/2022 4:52 PM  Performed by: Josie Upton P.A.-C.  Authorized by: Josie Upton P.A.-C.   Indications for incision and drainage: paronychia.  Body area: lower extremity  Location details: left great toe    Anesthesia:  Local Anesthetic: co-phenylcaine spray    Sedation:  Patient sedated: no    Scalpel size: 11  Incision type: single straight  Incision depth: dermal  Complexity: simple  Drainage: purulent and  serosanguinous  Drainage amount: moderate  Wound treatment: wound left open  Packing material: none  Patient tolerance: patient tolerated the procedure well with no immediate complications

## 2022-03-05 NOTE — PROGRESS NOTES
Subjective     Raj Melo is a 56 y.o. male who presents with Wound Infection (Left great to infection. Onset 1 week. )    Medications:    • lisinopril Tabs    Allergies: Patient has no known allergies.    Problem List: Ki Melo does not have any pertinent problems on file.    Surgical History:  Past Surgical History:   Procedure Laterality Date   • INGUINAL HERNIA REPAIR BILATERAL Bilateral 9/20/2017    Procedure: INGUINAL HERNIA REPAIR BILATERAL WITH MESH  ;  Surgeon: Ed Ro M.D.;  Location: SURGERY [facility];  Service: General   • OTHER ORTHOPEDIC SURGERY  2009    ORIF left wrist   • SEPTOTURBINOPLASTY  6/4/08    Performed by ED TIRADO at SURGERY SAME DAY [facility]   • OTHER ORTHOPEDIC SURGERY  2007    right knee scope       Past Social Hx: Ki Melo  reports that he has never smoked. He has never used smokeless tobacco. He reports current alcohol use of about 3.6 oz of alcohol per week. He reports current drug use. Frequency: 2.00 times per week. Drugs: Marijuana and Inhaled.     Past Family Hx:  Ki Melo family history includes Hypertension in his brother; Lung Disease in his father; No Known Problems in his brother and mother; Other in his father.     Problem list, medications, and allergies reviewed by myself today in Epic.          Patient presents with:  Wound Infection: Left great to infection. Onset 1 week ago. Pt has history of this type of infection in the past, but it has been quite some time.  No other complaint.   Pt has tried epsom salt soaks with no change in symtpoms.         Nail Problem  This is a new problem. The current episode started in the past 7 days. The problem occurs constantly. The problem has been gradually worsening. Pertinent negatives include no arthralgias, fever, joint swelling or numbness. The symptoms are aggravated by standing, walking and exertion. He has tried position changes, rest and NSAIDs for the  "symptoms. The treatment provided mild relief.       Review of Systems   Constitutional: Negative for fever.   Musculoskeletal: Negative for arthralgias and joint swelling.   Neurological: Negative for numbness.   All other systems reviewed and are negative.             Objective     /82 (BP Location: Left arm, Patient Position: Sitting, BP Cuff Size: Large adult)   Pulse 77   Temp 36.4 °C (97.5 °F) (Temporal)   Resp 16   Ht 1.727 m (5' 8\")   Wt 88 kg (194 lb)   SpO2 99%   BMI 29.50 kg/m²      Physical Exam  Vitals and nursing note reviewed.   Constitutional:       General: He is not in acute distress.     Appearance: Normal appearance. He is well-developed.   HENT:      Head: Normocephalic and atraumatic.      Nose: Nose normal.      Mouth/Throat:      Mouth: Mucous membranes are moist.   Eyes:      Extraocular Movements: Extraocular movements intact.      Conjunctiva/sclera: Conjunctivae normal.      Pupils: Pupils are equal, round, and reactive to light.   Cardiovascular:      Rate and Rhythm: Normal rate and regular rhythm.      Pulses: Normal pulses.      Heart sounds: Normal heart sounds.   Pulmonary:      Effort: Pulmonary effort is normal.      Breath sounds: Normal breath sounds.   Abdominal:      Palpations: Abdomen is soft.   Musculoskeletal:      Cervical back: Normal range of motion and neck supple.      Left foot: Normal capillary refill. Normal pulse.        Feet:    Skin:     General: Skin is warm and dry.      Capillary Refill: Capillary refill takes less than 2 seconds.   Neurological:      General: No focal deficit present.      Mental Status: He is alert and oriented to person, place, and time.      Motor: No abnormal muscle tone.   Psychiatric:         Mood and Affect: Mood normal.                             Assessment & Plan                 1. Paronychia of great toe of left foot  cephALEXin (KEFLEX) 500 MG Cap       Patient was evaluated in clinic today while wearing appropriate " personal protective equipment.      PT to begin prescription medications today as discussed.      PT to soak injured area in epsom salt/warm water soaks, 1-2 times daily until significantly improved.      Motrin/Advil/Ibuprophen 600 mg every 6 hours as needed for pain or fever.    PT should follow up with PCP in 1-2 days for re-evaluation if symptoms have not improved.      Discussed red flags and reasons to return to UC or ED.      Pt and/or family verbalized understanding of diagnosis and follow up instructions and was offered informational handout on diagnosis.  PT discharged.

## 2022-03-11 ASSESSMENT — ENCOUNTER SYMPTOMS
JOINT SWELLING: 0
ARTHRALGIAS: 0
FEVER: 0
NUMBNESS: 0

## 2022-03-12 ENCOUNTER — HOSPITAL ENCOUNTER (OUTPATIENT)
Dept: LAB | Facility: MEDICAL CENTER | Age: 57
End: 2022-03-12
Attending: NURSE PRACTITIONER
Payer: COMMERCIAL

## 2022-03-12 DIAGNOSIS — I10 PRIMARY HYPERTENSION: ICD-10-CM

## 2022-03-12 LAB
ALBUMIN SERPL BCP-MCNC: 4.9 G/DL (ref 3.2–4.9)
ALBUMIN/GLOB SERPL: 2 G/DL
ALP SERPL-CCNC: 71 U/L (ref 30–99)
ALT SERPL-CCNC: 29 U/L (ref 2–50)
ANION GAP SERPL CALC-SCNC: 13 MMOL/L (ref 7–16)
AST SERPL-CCNC: 23 U/L (ref 12–45)
BASOPHILS # BLD AUTO: 0.9 % (ref 0–1.8)
BASOPHILS # BLD: 0.06 K/UL (ref 0–0.12)
BILIRUB SERPL-MCNC: 0.4 MG/DL (ref 0.1–1.5)
BUN SERPL-MCNC: 14 MG/DL (ref 8–22)
CALCIUM SERPL-MCNC: 10.2 MG/DL (ref 8.5–10.5)
CHLORIDE SERPL-SCNC: 103 MMOL/L (ref 96–112)
CHOLEST SERPL-MCNC: 224 MG/DL (ref 100–199)
CO2 SERPL-SCNC: 23 MMOL/L (ref 20–33)
CREAT SERPL-MCNC: 1.03 MG/DL (ref 0.5–1.4)
EOSINOPHIL # BLD AUTO: 0.12 K/UL (ref 0–0.51)
EOSINOPHIL NFR BLD: 1.8 % (ref 0–6.9)
ERYTHROCYTE [DISTWIDTH] IN BLOOD BY AUTOMATED COUNT: 41.1 FL (ref 35.9–50)
FASTING STATUS PATIENT QL REPORTED: NORMAL
GLOBULIN SER CALC-MCNC: 2.5 G/DL (ref 1.9–3.5)
GLUCOSE SERPL-MCNC: 100 MG/DL (ref 65–99)
HCT VFR BLD AUTO: 46.4 % (ref 42–52)
HDLC SERPL-MCNC: 58 MG/DL
HGB BLD-MCNC: 15.8 G/DL (ref 14–18)
IMM GRANULOCYTES # BLD AUTO: 0.08 K/UL (ref 0–0.11)
IMM GRANULOCYTES NFR BLD AUTO: 1.2 % (ref 0–0.9)
LDLC SERPL CALC-MCNC: 130 MG/DL
LYMPHOCYTES # BLD AUTO: 2.04 K/UL (ref 1–4.8)
LYMPHOCYTES NFR BLD: 31.1 % (ref 22–41)
MCH RBC QN AUTO: 30.6 PG (ref 27–33)
MCHC RBC AUTO-ENTMCNC: 34.1 G/DL (ref 33.7–35.3)
MCV RBC AUTO: 89.9 FL (ref 81.4–97.8)
MONOCYTES # BLD AUTO: 0.48 K/UL (ref 0–0.85)
MONOCYTES NFR BLD AUTO: 7.3 % (ref 0–13.4)
NEUTROPHILS # BLD AUTO: 3.77 K/UL (ref 1.82–7.42)
NEUTROPHILS NFR BLD: 57.7 % (ref 44–72)
NRBC # BLD AUTO: 0 K/UL
NRBC BLD-RTO: 0 /100 WBC
PLATELET # BLD AUTO: 262 K/UL (ref 164–446)
PMV BLD AUTO: 10.7 FL (ref 9–12.9)
POTASSIUM SERPL-SCNC: 4.8 MMOL/L (ref 3.6–5.5)
PROT SERPL-MCNC: 7.4 G/DL (ref 6–8.2)
RBC # BLD AUTO: 5.16 M/UL (ref 4.7–6.1)
SODIUM SERPL-SCNC: 139 MMOL/L (ref 135–145)
TRIGL SERPL-MCNC: 180 MG/DL (ref 0–149)
TSH SERPL DL<=0.005 MIU/L-ACNC: 2.19 UIU/ML (ref 0.38–5.33)
WBC # BLD AUTO: 6.6 K/UL (ref 4.8–10.8)

## 2022-03-12 PROCEDURE — 80053 COMPREHEN METABOLIC PANEL: CPT

## 2022-03-12 PROCEDURE — 36415 COLL VENOUS BLD VENIPUNCTURE: CPT

## 2022-03-12 PROCEDURE — 84443 ASSAY THYROID STIM HORMONE: CPT

## 2022-03-12 PROCEDURE — 80061 LIPID PANEL: CPT

## 2022-03-12 PROCEDURE — 85025 COMPLETE CBC W/AUTO DIFF WBC: CPT

## 2022-03-30 ENCOUNTER — OFFICE VISIT (OUTPATIENT)
Dept: MEDICAL GROUP | Facility: PHYSICIAN GROUP | Age: 57
End: 2022-03-30
Payer: COMMERCIAL

## 2022-03-30 VITALS
SYSTOLIC BLOOD PRESSURE: 100 MMHG | WEIGHT: 189 LBS | HEART RATE: 62 BPM | TEMPERATURE: 97.6 F | BODY MASS INDEX: 28.64 KG/M2 | HEIGHT: 68 IN | OXYGEN SATURATION: 100 % | DIASTOLIC BLOOD PRESSURE: 62 MMHG

## 2022-03-30 DIAGNOSIS — E78.5 DYSLIPIDEMIA: ICD-10-CM

## 2022-03-30 DIAGNOSIS — I10 PRIMARY HYPERTENSION: ICD-10-CM

## 2022-03-30 PROCEDURE — 99214 OFFICE O/P EST MOD 30 MIN: CPT | Performed by: NURSE PRACTITIONER

## 2022-03-30 RX ORDER — LISINOPRIL 10 MG/1
10 TABLET ORAL DAILY
Qty: 90 TABLET | Refills: 2 | Status: SHIPPED | OUTPATIENT
Start: 2022-03-30 | End: 2022-10-24

## 2022-03-30 RX ORDER — ATORVASTATIN CALCIUM 10 MG/1
10 TABLET, FILM COATED ORAL NIGHTLY
Qty: 90 TABLET | Refills: 1 | Status: SHIPPED | OUTPATIENT
Start: 2022-03-30 | End: 2022-06-06 | Stop reason: SDUPTHER

## 2022-03-30 ASSESSMENT — FIBROSIS 4 INDEX: FIB4 SCORE: 0.9

## 2022-03-30 NOTE — ASSESSMENT & PLAN NOTE
His initial blood pressure today is 100/60.  He continues with his lisinopril 10 mg daily.  He has been monitoring his blood pressure at home and brings his blood pressure log today.  His blood pressures has been ranging 110-144/74-91. He stopped his coffee intake. He had his dose this morning at 5:30 a.m.

## 2022-03-30 NOTE — PROGRESS NOTES
"Subjective:     CC: Follow-up and lab results    HPI:   Ki presents today with the following:    Dyslipidemia  He has been on fenofibrate in the past. He is walking daily at work, averaging 5-6 miles, and on weekends he walks his dogs 1.5 miles. The 10-year ASCVD risk score ([place] MATTHEW Jr., et al., 2013) is: 4.2%.  He had recent labs completed that he would like to review today.      Primary hypertension  His initial blood pressure today is 100/60.  He continues with his lisinopril 10 mg daily.  He has been monitoring his blood pressure at home and brings his blood pressure log today.  His blood pressures has been ranging 110-144/74-91. He stopped his coffee intake. He had his dose this morning at 5:30 a.m.      Past Medical History:   Diagnosis Date   • Heart burn    • High cholesterol    • Insomnia 7/6/2018       Social History     Tobacco Use   • Smoking status: Never Smoker   • Smokeless tobacco: Never Used   Vaping Use   • Vaping Use: Every day   • Substances: THC   • Devices: Disposable   Substance Use Topics   • Alcohol use: Yes     Alcohol/week: 3.6 oz     Types: 6 Cans of beer per week   • Drug use: Yes     Frequency: 2.0 times per week     Types: Marijuana, Inhaled       Current Outpatient Medications Ordered in Epic   Medication Sig Dispense Refill   • atorvastatin (LIPITOR) 10 MG Tab Take 1 Tablet by mouth every evening. 90 Tablet 1   • lisinopril (PRINIVIL) 10 MG Tab Take 1 Tablet by mouth every day. 90 Tablet 2     No current Epic-ordered facility-administered medications on file.       Allergies:  Patient has no known allergies.    Health Maintenance: Reviewed, colonoscopy has been ordered.    Objective:     Vital signs reviewed  Exam:  /62 (BP Location: Left arm, Patient Position: Sitting)   Pulse 62   Temp 36.4 °C (97.6 °F) (Temporal)   Ht 1.727 m (5' 8\")   Wt 85.7 kg (189 lb)   SpO2 100%   BMI 28.74 kg/m²  Body mass index is 28.74 kg/m².    Gen: Alert and oriented, No apparent " distress.  Eyes:   Lids normal. Glasses in place.   Lungs: Normal effort, CTA bilaterally, no wheezes, rhonchi, or rales  CV: Regular rate and rhythm. No murmurs, rubs, or gallops.  Ext: No clubbing, cyanosis, edema.      Assessment & Plan:     56 y.o. male with the following -     1. Dyslipidemia  Chronic exacerbated problem.  Discussed and reviewed his recent lab results.  Review of his chart showed that he had previously been on fenofibrate.  We did discuss starting cholesterol medication and he is in agreement.  We will start him on atorvastatin 10 mg every evening.  Discussed diet and lifestyle modifications.  Check updated labs in 3 months before next appointment.  - atorvastatin (LIPITOR) 10 MG Tab; Take 1 Tablet by mouth every evening.  Dispense: 90 Tablet; Refill: 1  - Lipid Profile; Future    2. Primary hypertension  Chronic stable problem.  Review of his home blood pressures show that he has been at goal.  Today's reading is the lowest reading that he has had.  He is not having any chest pain, shortness of breath, dizziness or lightheadedness today.  We did discuss and reviewed his recent lab results.  Discussed continuing with home blood pressure monitoring.  Discussed and symptoms of low blood pressure.  He will continue with diet and lifestyle modifications.  Return in 3 months for follow-up.  - lisinopril (PRINIVIL) 10 MG Tab; Take 1 Tablet by mouth every day.  Dispense: 90 Tablet; Refill: 2      Return in about 3 months (around 6/30/2022) for Hypertension, cholesterol, Labs.    Please note that this dictation was created using voice recognition software. I have made every reasonable attempt to correct obvious errors, but I expect that there are errors of grammar and possibly content that I did not discover before finalizing the note.

## 2022-03-30 NOTE — ASSESSMENT & PLAN NOTE
He has been on fenofibrate in the past. He is walking daily at work, averaging 5-6 miles, and on weekends he walks his dogs 1.5 miles. The 10-year ASCVD risk score ([place] MAT[place] Jr., et al., 2013) is: 4.2%.  He had recent labs completed that he would like to review today.

## 2022-04-29 ENCOUNTER — OFFICE VISIT (OUTPATIENT)
Dept: URGENT CARE | Facility: PHYSICIAN GROUP | Age: 57
End: 2022-04-29
Payer: COMMERCIAL

## 2022-04-29 VITALS
DIASTOLIC BLOOD PRESSURE: 72 MMHG | SYSTOLIC BLOOD PRESSURE: 128 MMHG | BODY MASS INDEX: 26.98 KG/M2 | TEMPERATURE: 98.2 F | HEIGHT: 68 IN | RESPIRATION RATE: 16 BRPM | WEIGHT: 178 LBS | HEART RATE: 74 BPM | OXYGEN SATURATION: 97 %

## 2022-04-29 DIAGNOSIS — J02.9 SORE THROAT: ICD-10-CM

## 2022-04-29 LAB
INT CON NEG: NEGATIVE
INT CON POS: POSITIVE
S PYO AG THROAT QL: NORMAL

## 2022-04-29 PROCEDURE — 99213 OFFICE O/P EST LOW 20 MIN: CPT | Performed by: FAMILY MEDICINE

## 2022-04-29 PROCEDURE — 87880 STREP A ASSAY W/OPTIC: CPT | Performed by: FAMILY MEDICINE

## 2022-04-29 ASSESSMENT — FIBROSIS 4 INDEX: FIB4 SCORE: 0.9

## 2022-04-29 NOTE — PROGRESS NOTES
"  Subjective:      56 y.o. male presents to urgent care for cold symptoms that started Wednesday. He is experiencing sore throat, cough, headache, and body aches. No fevers or diarrhea. He has tried throat lozenges and breathe right teas with moderate relief in symptoms. He denies any tobacco product use.  No history of asthma or COPD.  He is fully vaccinated against COVID.  He has had no known sick contacts.    He denies any other questions or concerns at this time.    Current problem list, medication, and past medical/surgical history were reviewed in Epic.    ROS  See HPI     Objective:      /72 (BP Location: Right arm, Patient Position: Sitting, BP Cuff Size: Adult)   Pulse 74   Temp 36.8 °C (98.2 °F) (Temporal)   Resp 16   Ht 1.727 m (5' 8\")   Wt 80.7 kg (178 lb)   SpO2 97%   BMI 27.06 kg/m²     Physical Exam  Constitutional:       General: He is not in acute distress.     Appearance: He is not diaphoretic.   HENT:      Right Ear: Tympanic membrane, ear canal and external ear normal.      Left Ear: Tympanic membrane, ear canal and external ear normal.      Nose:      Right Sinus: No maxillary sinus tenderness or frontal sinus tenderness.      Left Sinus: No maxillary sinus tenderness or frontal sinus tenderness.      Mouth/Throat:      Pharynx: Uvula midline. Posterior oropharyngeal erythema present.      Tonsils: No tonsillar exudate.   Cardiovascular:      Rate and Rhythm: Normal rate and regular rhythm.      Heart sounds: Normal heart sounds.   Pulmonary:      Effort: Pulmonary effort is normal. No respiratory distress.      Breath sounds: Normal breath sounds.   Neurological:      Mental Status: He is alert.   Psychiatric:         Mood and Affect: Affect normal.         Judgment: Judgment normal.       Assessment/Plan:     1. Sore throat  Rapid strep negative.  Likely viral etiology.  He was encouraged to use Tylenol, ibuprofen, Sucrets, and gargle warm salt water as needed.  - POCT Rapid Strep " A      Instructed to return to Urgent Care or nearest Emergency Department if symptoms fail to improve, for any change in condition, further concerns, or new concerning symptoms. Patient states understanding of the plan of care and discharge instructions.    Heidi Dumont M.D.

## 2022-06-02 ENCOUNTER — HOSPITAL ENCOUNTER (OUTPATIENT)
Dept: LAB | Facility: MEDICAL CENTER | Age: 57
End: 2022-06-02
Attending: NURSE PRACTITIONER
Payer: COMMERCIAL

## 2022-06-02 DIAGNOSIS — E78.5 DYSLIPIDEMIA: ICD-10-CM

## 2022-06-02 LAB
CHOLEST SERPL-MCNC: 185 MG/DL (ref 100–199)
FASTING STATUS PATIENT QL REPORTED: NORMAL
HDLC SERPL-MCNC: 60 MG/DL
LDLC SERPL CALC-MCNC: 93 MG/DL
TRIGL SERPL-MCNC: 159 MG/DL (ref 0–149)

## 2022-06-02 PROCEDURE — 80061 LIPID PANEL: CPT

## 2022-06-02 PROCEDURE — 36415 COLL VENOUS BLD VENIPUNCTURE: CPT

## 2022-06-06 ENCOUNTER — OFFICE VISIT (OUTPATIENT)
Dept: MEDICAL GROUP | Facility: PHYSICIAN GROUP | Age: 57
End: 2022-06-06
Payer: COMMERCIAL

## 2022-06-06 VITALS
TEMPERATURE: 97 F | SYSTOLIC BLOOD PRESSURE: 130 MMHG | OXYGEN SATURATION: 98 % | DIASTOLIC BLOOD PRESSURE: 80 MMHG | HEIGHT: 68 IN | BODY MASS INDEX: 29.1 KG/M2 | HEART RATE: 72 BPM | WEIGHT: 192 LBS

## 2022-06-06 DIAGNOSIS — E78.5 DYSLIPIDEMIA: ICD-10-CM

## 2022-06-06 DIAGNOSIS — E78.00 HIGH CHOLESTEROL: ICD-10-CM

## 2022-06-06 DIAGNOSIS — I10 PRIMARY HYPERTENSION: ICD-10-CM

## 2022-06-06 PROCEDURE — 99214 OFFICE O/P EST MOD 30 MIN: CPT | Performed by: NURSE PRACTITIONER

## 2022-06-06 RX ORDER — ATORVASTATIN CALCIUM 10 MG/1
10 TABLET, FILM COATED ORAL NIGHTLY
Qty: 90 TABLET | Refills: 3 | Status: SHIPPED | OUTPATIENT
Start: 2022-06-06 | End: 2022-09-06 | Stop reason: SDUPTHER

## 2022-06-06 ASSESSMENT — FIBROSIS 4 INDEX: FIB4 SCORE: 0.9

## 2022-06-06 NOTE — ASSESSMENT & PLAN NOTE
His blood pressure is at goal today.  He is taking lisinopril 10 mg daily. He is checking his blood pressure at home and reports his home blood pressure is averaging 130's/80's.  No new chest pain, shortness of breath, dizziness, blurry vision or headache.

## 2022-06-06 NOTE — ASSESSMENT & PLAN NOTE
He had recent labs completed that he would like to review today.  He is currently taking atorvastatin 10 mg nightly. He plans to increase his exercise. He is currently doing 25 push-ups daily. He is eating spinach salad for lunch without dressing. Breakfast is fruit and granola bar. Dinner is chicken, rare red meat, and vegetables.

## 2022-06-06 NOTE — PROGRESS NOTES
"Subjective:     CC: lab results     HPI:   Ki presents today with the following:    Dyslipidemia  He had recent labs completed that he would like to review today.  He is currently taking atorvastatin 10 mg nightly. He plans to increase his exercise. He is currently doing 25 push-ups daily. He is eating spinach salad for lunch without dressing. Breakfast is fruit and granola bar. Dinner is chicken, rare red meat, and vegetables.     Primary hypertension  His blood pressure is at goal today.  He is taking lisinopril 10 mg daily. He is checking his blood pressure at home and reports his home blood pressure is averaging 130's/80's.  No new chest pain, shortness of breath, dizziness, blurry vision or headache.      Past Medical History:   Diagnosis Date   • Heart burn    • High cholesterol    • Insomnia 7/6/2018       Social History     Tobacco Use   • Smoking status: Never Smoker   • Smokeless tobacco: Never Used   Vaping Use   • Vaping Use: Every day   • Substances: THC   • Devices: Disposable   Substance Use Topics   • Alcohol use: Yes     Alcohol/week: 3.6 oz     Types: 6 Cans of beer per week     Comment: occ   • Drug use: Yes     Frequency: 2.0 times per week     Types: Marijuana, Inhaled       Current Outpatient Medications Ordered in Epic   Medication Sig Dispense Refill   • atorvastatin (LIPITOR) 10 MG Tab Take 1 Tablet by mouth every evening. 90 Tablet 3   • lisinopril (PRINIVIL) 10 MG Tab Take 1 Tablet by mouth every day. 90 Tablet 2     No current Epic-ordered facility-administered medications on file.       Allergies:  Patient has no known allergies.    Health Maintenance: Completed. He will schedule his colonoscopy.       Objective:     Vital signs reviewed  Exam:  /80 (BP Location: Left arm, Patient Position: Sitting, BP Cuff Size: Adult)   Pulse 72   Temp 36.1 °C (97 °F) (Temporal)   Ht 1.727 m (5' 8\")   Wt 87.1 kg (192 lb)   SpO2 98%   BMI 29.19 kg/m²  Body mass index is 29.19 " kg/m².    Gen: Alert and oriented, No apparent distress.  Eyes:   Lids normal. Glasses in place.   Neck: Neck is supple without lymphadenopathy.  Lungs: Normal effort, CTA bilaterally, no wheezes, rhonchi, or rales  CV: Regular rate and rhythm. No murmurs, rubs, or gallops.  Ext: No clubbing, cyanosis, edema.      Assessment & Plan:     56 y.o. male with the following -     1. Dyslipidemia  Chronic stable problem.  Discussed and reviewed his recent lab results.  Cholesterol is controlled.  He will continue with his atorvastatin 10 mg daily.  He will continue healthy diet and we discussed increasing his exercise to at least 150 minutes of moderate intensity a week.  He will return in 6 months for follow-up.  - atorvastatin (LIPITOR) 10 MG Tab; Take 1 Tablet by mouth every evening.  Dispense: 90 Tablet; Refill: 3    2. Primary hypertension  Chronic stable problem.  Blood pressure is at goal today.  He will continue with his lisinopril 10 mg daily.  He will continue home blood pressure monitoring.  Continue heart healthy diet and exercise.  He will return in 6 months for follow-up.      Return in about 6 months (around 12/6/2022) for Hypertension, cholesterol.    Please note that this dictation was created using voice recognition software. I have made every reasonable attempt to correct obvious errors, but I expect that there are errors of grammar and possibly content that I did not discover before finalizing the note.

## 2022-07-18 ENCOUNTER — TELEPHONE (OUTPATIENT)
Dept: MEDICAL GROUP | Facility: PHYSICIAN GROUP | Age: 57
End: 2022-07-18
Payer: COMMERCIAL

## 2022-07-18 NOTE — TELEPHONE ENCOUNTER
Phone Number Called: [phone] (home) [phone] (work)      Call outcome: Spoke to patient regarding message below.    Message: Called patient to follow up on the referral sent 03/2/22 to GI for Colonoscopy. Patient said he was able to schedule the appointment for 08/19/2022.

## 2022-09-06 DIAGNOSIS — E78.5 DYSLIPIDEMIA: ICD-10-CM

## 2022-09-06 RX ORDER — ATORVASTATIN CALCIUM 10 MG/1
10 TABLET, FILM COATED ORAL NIGHTLY
Qty: 90 TABLET | Refills: 3 | Status: SHIPPED | OUTPATIENT
Start: 2022-09-06 | End: 2023-08-22 | Stop reason: SDUPTHER

## 2022-09-06 NOTE — TELEPHONE ENCOUNTER
Received request via: Pharmacy    Was the patient seen in the last year in this department? Yes    Does the patient have an active prescription (recently filled or refills available) for medication(s) requested? Yes. New pharmacy

## 2022-09-07 NOTE — TELEPHONE ENCOUNTER
Requested Prescriptions     Signed Prescriptions Disp Refills    atorvastatin (LIPITOR) 10 MG Tab 90 Tablet 3     Sig: Take 1 Tablet by mouth every evening.     Authorizing Provider: TORIBIO MARKS A.P.R.N.

## 2022-10-24 ENCOUNTER — OFFICE VISIT (OUTPATIENT)
Dept: MEDICAL GROUP | Facility: PHYSICIAN GROUP | Age: 57
End: 2022-10-24
Payer: COMMERCIAL

## 2022-10-24 VITALS
TEMPERATURE: 97 F | SYSTOLIC BLOOD PRESSURE: 116 MMHG | OXYGEN SATURATION: 98 % | DIASTOLIC BLOOD PRESSURE: 72 MMHG | HEIGHT: 68 IN | WEIGHT: 193 LBS | HEART RATE: 62 BPM | BODY MASS INDEX: 29.25 KG/M2

## 2022-10-24 DIAGNOSIS — I10 PRIMARY HYPERTENSION: ICD-10-CM

## 2022-10-24 DIAGNOSIS — R05.9 COUGH IN ADULT: ICD-10-CM

## 2022-10-24 DIAGNOSIS — J30.9 ALLERGIC RHINITIS, UNSPECIFIED SEASONALITY, UNSPECIFIED TRIGGER: ICD-10-CM

## 2022-10-24 PROCEDURE — 99214 OFFICE O/P EST MOD 30 MIN: CPT | Performed by: NURSE PRACTITIONER

## 2022-10-24 RX ORDER — LOSARTAN POTASSIUM 25 MG/1
25 TABLET ORAL DAILY
Qty: 30 TABLET | Refills: 2 | Status: SHIPPED | OUTPATIENT
Start: 2022-10-24 | End: 2022-11-21 | Stop reason: SDUPTHER

## 2022-10-24 ASSESSMENT — FIBROSIS 4 INDEX: FIB4 SCORE: 0.91

## 2022-10-24 NOTE — PROGRESS NOTES
Subjective:     CC: Cough    HPI:   Ki presents today with the following:    Cough in adult  The cough started 2 weeks ago. The cough is nonproductive, dry, constant and described as moderate with a tickle. Associated symptoms include rhinorrhea, watery eyes, nasal congestion, minor sore throat with swallowing and mild chest burning. Aggravating factors include laying down. Alleviating factors include drinking cold water and Robitussin at night helps. Denies fever, chills, body aches, sinus pressure, ear pain, itchy eyes, loss of taste, loss of smell, chest pain, lower extremity edema, nausea, vomiting, diarrhea, orthopnea or shortness of breath. He is on lisinopril for blood pressure that was started in March 2022.  Reports history of seasonal allergies and has been taking Claritin for his itchy eyes and nasal congestion for the last 3 weeks.    Primary hypertension  Blood pressure is at goal today. He continues with lisinopril 10 mg daily. He is checking his blood pressure at home.  He states that his home blood pressures been running 130-135/84.  No chest pain or shortness of breath but has had recent nonproductive cough that started 2 weeks ago.    Past Medical History:   Diagnosis Date    Heart burn     High cholesterol     Insomnia 7/6/2018       Social History     Tobacco Use    Smoking status: Never    Smokeless tobacco: Never   Vaping Use    Vaping Use: Every day    Substances: THC    Devices: Disposable   Substance Use Topics    Alcohol use: Not Currently     Comment: 2 drinks each day on weekend    Drug use: Yes     Frequency: 2.0 times per week     Types: Marijuana, Inhaled       Current Outpatient Medications Ordered in Epic   Medication Sig Dispense Refill    losartan (COZAAR) 25 MG Tab Take 1 Tablet by mouth every day. 30 Tablet 2    atorvastatin (LIPITOR) 10 MG Tab Take 1 Tablet by mouth every evening. 90 Tablet 3     No current Epic-ordered facility-administered medications on file.  "      Allergies:  Patient has no known allergies.    Health Maintenance: Reviewed.  He has had recent COVID immunization and influenza immunization.      Objective:     Vital signs reviewed  Exam:  /72 (BP Location: Left arm, Patient Position: Sitting, BP Cuff Size: Adult)   Pulse 62   Temp 36.1 °C (97 °F) (Temporal)   Ht 1.727 m (5' 8\")   Wt 87.5 kg (193 lb)   SpO2 98%   BMI 29.35 kg/m²  Body mass index is 29.35 kg/m².    General: Normal appearing. No distress.  HENT: Normocephalic. Ears normal shape and contour, canals are clear bilaterally, tympanic membranes are benign, nasal mucosa with edema to bilateral turbinates and right turbinate with mucus present, oropharynx is without erythema, edema or exudates.  Pain on palpation to maxillary and frontal sinuses.  Eyes: Eyes conjunctiva clear lids without ptosis, pupils equal and reactive to light accommodation, lids normal.  Pulmonary: Clear to ausculation.  Normal effort. No rales, ronchi, or wheezing.  Cardiovascular: Regular rate and rhythm without murmur.   Lymph: No cervical or supraclavicular lymph nodes are palpable.  Skin: Warm and dry.  No obvious lesions.  Psych: Normal mood and affect. Alert and oriented x3. Judgment and insight is normal.      Assessment & Plan:     57 y.o. male with the following -     1. Cough in adult  Acute uncomplicated problem.  Exam does not appear to be consistent with any infectious processes today.  Given the erythema in his turbinates we will start him on over-the-counter Flonase nasal spray and he will continue with his Claritin.  We are switching his lisinopril to losartan.    2. Primary hypertension  Chronic exacerbated problem.  His exam is unremarkable today and does appear consistent with allergies.  We did discuss possibility that the lisinopril may be causing the dry nonproductive cough and at which point we will stop his lisinopril today.  He will start losartan 25 mg daily.  He will continue home blood " pressure monitoring, blood pressure log provided today.  He will return in 1 month for follow-up.  - losartan (COZAAR) 25 MG Tab; Take 1 Tablet by mouth every day.  Dispense: 30 Tablet; Refill: 2    3. Allergic rhinitis, unspecified seasonality, unspecified trigger  Chronic exacerbated problem.  Appears consistent with allergies today.  His symptoms started 2 weeks ago at this point I believe it is COVID.  His vital signs are stable and he is afebrile.  We will start him on over-the-counter Flonase nasal spray and he will continue with his Claritin.  General and above.      Return in about 4 weeks (around 11/21/2022) for Hypertension.    Please note that this dictation was created using voice recognition software. I have made every reasonable attempt to correct obvious errors, but I expect that there are errors of grammar and possibly content that I did not discover before finalizing the note.

## 2022-10-24 NOTE — ASSESSMENT & PLAN NOTE
Blood pressure is at goal today. He continues with lisinopril 10 mg daily. He is checking his blood pressure at home.  He states that his home blood pressures been running 130-135/84.  No chest pain or shortness of breath but has had recent nonproductive cough that started 2 weeks ago.

## 2022-10-24 NOTE — NON-PROVIDER
Subjective:     CC: cough.    HPI:   Ki presents today with the following:    Persistent dry cough     The cough started  2 weeks ago. It is constant. The cough is non-productive, and is aggravated by lying down at night.  Relieving factors include drinking cold water. He has tried Robitussin with some effect, helps him sleep.  He describes the cough severity as moderate.      Allergic Rhinitis    Itchy eyes and nasal congestion x 2- 3 weeks. He thinks this is contributing to the cough. He reports having seasonal allergies for which he takes Claritin prn.     Primary Hypertension       His BP is at goal today. He checks his BP at home. He has been taking lisinopril since March 2022. He c/o constant non-productive cough for the last 2 weeks.           .       ROS:    General: Denies fever, chills, body aches  HEENT: Positive for rhinitis and itchy eyes. Denies sinus pain. Denies ear pain, loss of taste or smell. Positive for mild sore throat        Lungs:  Denies shortness of breath, orthopnea  CV:     Denies chest pain except mild central chest pain with coughing. Denies peripheral    edema.   GI:     Denies nausea, vomiting, diarrhea. Denies GERD symptoms.       Past Medical History:   Diagnosis Date    Heart burn     High cholesterol     Insomnia 7/6/2018       Social History     Tobacco Use    Smoking status: Never    Smokeless tobacco: Never   Vaping Use    Vaping Use: Every day    Substances: THC    Devices: Disposable   Substance Use Topics    Alcohol use: Yes     Alcohol/week: 3.6 oz     Types: 6 Cans of beer per week     Comment: occ    Drug use: Yes     Frequency: 2.0 times per week     Types: Marijuana, Inhaled       Current Outpatient Medications Ordered in Epic   Medication Sig Dispense Refill    atorvastatin (LIPITOR) 10 MG Tab Take 1 Tablet by mouth every evening. 90 Tablet 3    lisinopril (PRINIVIL) 10 MG Tab Take 1 Tablet by mouth every day. 90 Tablet 2     No current Epic-ordered  facility-administered medications on file.       Allergies:  Patient has no known allergies.    Health Maintenance: He is current with influenza and Covid-19 vaccinations    Objective:     Vitals:    10/24/22 0731   BP: 116/72   Pulse: 62   Temp: 36.1 °C (97 °F)   SpO2: 98%     Vital signs reviewed    Exam:    Gen:      Alert and oriented, No apparent distress.  HEENT: Ear canals clear, TM visible, pearly grey with positive light reflex, no bulging. Nasal septum midline. Turbinates with mild erythema and edema, minimal clear exudates present. No pain to palpation or percussion of frontal and maxillary sinuses. Throat with mild erythema, no exudates noted.    Neck:   Neck is supple without lymphadenopathy.  Lungs:   Normal effort, CTA bilaterally, no wheezes, rhonchi, or rales  CV:   Regular rate and rhythm. No murmurs, rubs, or gallops.          Assessment & Plan:     57 y.o. male with the following -     Raj is a 57 year-old male presenting with itchy eyes, mild nasal congestion, and persistent dry cough x 2 weeks consistent with seasonal allergies/rhinitis. He takes lisinopril for HTN which may be contributing to his persistent cough.     1. Cough in adult  -We will dc his lisinopril due to possibly contributing to his cough.     2. Primary hypertension  Discontinue lisinopril.   - losartan (COZAAR) 25 MG Tab; Take 1 Tablet by mouth every day.  Dispense: 30 Tablet; Refill: 2   - patient to keep a BP log at home, check daily BP. Log sheet and instructions given.    3. Allergic Rhinitis  -fluticasone propionate (Flonase) nasal spray 50mcg 1 spray to each nare 2 x day.  -continue OTC Claritin daily as needed.     -Educated in proper administration of medications ordered today including safety, possible SE, risks, benefits, rationale for treatment.   -Follow-up in 4 weeks. Bring BP log to appointment.  -Return to clinic sooner if symptoms do not improve or worsen.       Please note that this dictation was created  using voice recognition software. I have made every reasonable attempt to correct obvious errors, but I expect that there are errors of grammar and possibly content that I did not discover before finalizing the note.

## 2022-10-24 NOTE — ASSESSMENT & PLAN NOTE
The cough started 2 weeks ago. The cough is nonproductive, dry, constant and described as moderate with a tickle. Associated symptoms include rhinorrhea, watery eyes, nasal congestion, minor sore throat with swallowing and mild chest burning. Aggravating factors include laying down. Alleviating factors include drinking cold water and Robitussin at night helps. Denies fever, chills, body aches, sinus pressure, ear pain, itchy eyes, loss of taste, loss of smell, chest pain, lower extremity edema, nausea, vomiting, diarrhea, orthopnea or shortness of breath. He is on lisinopril for blood pressure that was started in March 2022.  Reports history of seasonal allergies and has been taking Claritin for his itchy eyes and nasal congestion for the last 3 weeks.

## 2022-11-21 ENCOUNTER — OFFICE VISIT (OUTPATIENT)
Dept: MEDICAL GROUP | Facility: PHYSICIAN GROUP | Age: 57
End: 2022-11-21
Payer: COMMERCIAL

## 2022-11-21 VITALS
TEMPERATURE: 97.6 F | BODY MASS INDEX: 28.95 KG/M2 | WEIGHT: 191 LBS | SYSTOLIC BLOOD PRESSURE: 128 MMHG | OXYGEN SATURATION: 98 % | HEART RATE: 71 BPM | DIASTOLIC BLOOD PRESSURE: 80 MMHG | HEIGHT: 68 IN

## 2022-11-21 DIAGNOSIS — I10 PRIMARY HYPERTENSION: ICD-10-CM

## 2022-11-21 DIAGNOSIS — E78.5 DYSLIPIDEMIA: ICD-10-CM

## 2022-11-21 PROBLEM — R05.9 COUGH IN ADULT: Status: RESOLVED | Noted: 2022-10-24 | Resolved: 2022-11-21

## 2022-11-21 PROCEDURE — 99214 OFFICE O/P EST MOD 30 MIN: CPT | Performed by: NURSE PRACTITIONER

## 2022-11-21 RX ORDER — LOSARTAN POTASSIUM 25 MG/1
25 TABLET ORAL DAILY
Qty: 90 TABLET | Refills: 3 | Status: SHIPPED | OUTPATIENT
Start: 2022-12-05 | End: 2023-08-22 | Stop reason: SDUPTHER

## 2022-11-21 ASSESSMENT — FIBROSIS 4 INDEX: FIB4 SCORE: 0.91

## 2022-11-21 NOTE — PATIENT INSTRUCTIONS
Schedule colonoscopy for colon cancer screening.  Referral has been approved for:    [facility]  [address] [place]. [address]  Phone: [phone]

## 2022-11-21 NOTE — ASSESSMENT & PLAN NOTE
His lisinopril was stopped at his last appointment due to nonproductive cough and he was started on losartan 25 mg daily.  His blood pressure today is 128/80.  He has been monitoring his blood pressure at home and states his home blood pressures have been 130/80. His highest reading was 140/90 but notes that he was taking cold medicine Mucinex.  After stopping the Mucinex his blood pressure returned to 130/80.  He notes the cough has improved, less frequent. Denies chest pain, shortness of breath, dizziness, blurry vision or headaches.

## 2022-11-21 NOTE — ASSESSMENT & PLAN NOTE
He continues with atorvastatin 10 mg nightly.  He is tolerating the medication. He is walking and doing 25 push ups daily.

## 2022-11-21 NOTE — PROGRESS NOTES
"Subjective:     CC: Hypertension follow-up    HPI:   Ki presents today with the following:    Primary hypertension  His lisinopril was stopped at his last appointment due to nonproductive cough and he was started on losartan 25 mg daily.  His blood pressure today is 128/80.  He has been monitoring his blood pressure at home and states his home blood pressures have been 130/80. His highest reading was 140/90 but notes that he was taking cold medicine Mucinex.  After stopping the Mucinex his blood pressure returned to 130/80.  He notes the cough has improved, less frequent. Denies chest pain, shortness of breath, dizziness, blurry vision or headaches.    Dyslipidemia  He continues with atorvastatin 10 mg nightly.  He is tolerating the medication. He is walking and doing 25 push ups daily.    Past Medical History:   Diagnosis Date    Heart burn     High cholesterol     Insomnia 7/6/2018       Social History     Tobacco Use    Smoking status: Never    Smokeless tobacco: Never   Vaping Use    Vaping Use: Never used   Substance Use Topics    Alcohol use: Not Currently     Comment: 2 drinks each day on weekend    Drug use: Yes     Frequency: 2.0 times per week     Types: Marijuana, Inhaled     Comment: joints qhs       Current Outpatient Medications Ordered in Epic   Medication Sig Dispense Refill    [START ON 12/5/2022] losartan (COZAAR) 25 MG Tab Take 1 Tablet by mouth every day. 90 Tablet 3    atorvastatin (LIPITOR) 10 MG Tab Take 1 Tablet by mouth every evening. 90 Tablet 3     No current Epic-ordered facility-administered medications on file.       Allergies:  Patient has no known allergies.    Health Maintenance: Reviewed.  Colonoscopy is ordered.      Objective:     Vital signs reviewed  Exam:  /80 (BP Location: Right arm, Patient Position: Sitting, BP Cuff Size: Adult)   Pulse 71   Temp 36.4 °C (97.6 °F) (Temporal)   Ht 1.727 m (5' 8\")   Wt 86.6 kg (191 lb)   SpO2 98%   BMI 29.04 kg/m²  Body mass " index is 29.04 kg/m².    Gen: Alert and oriented, No apparent distress.  Eyes:   Lids normal. Glasses in place.   Lungs: Normal effort, CTA bilaterally, no wheezes, rhonchi, or rales  CV: Regular rate and rhythm. No murmurs, rubs, or gallops.  Ext: No clubbing, cyanosis, edema.      Assessment & Plan:     57 y.o. male with the following -     1. Primary hypertension  Chronic stable problem.  Blood pressure is at goal today.  He will continue with losartan 25 mg daily.  Continue intermittent home blood pressure monitoring.  Return in 3 months for follow-up.  Continue healthy diet and exercise.  - losartan (COZAAR) 25 MG Tab; Take 1 Tablet by mouth every day.  Dispense: 90 Tablet; Refill: 3    2. Dyslipidemia  Chronic stable problem.  Continue healthy diet and exercise.  Continue with atorvastatin 10 mg nightly.      Return in about 3 months (around 2/21/2023) for Hypertension.    Please note that this dictation was created using voice recognition software. I have made every reasonable attempt to correct obvious errors, but I expect that there are errors of grammar and possibly content that I did not discover before finalizing the note.

## 2023-02-21 ENCOUNTER — OFFICE VISIT (OUTPATIENT)
Dept: MEDICAL GROUP | Facility: PHYSICIAN GROUP | Age: 58
End: 2023-02-21
Payer: COMMERCIAL

## 2023-02-21 VITALS
OXYGEN SATURATION: 98 % | WEIGHT: 195 LBS | HEART RATE: 65 BPM | SYSTOLIC BLOOD PRESSURE: 126 MMHG | TEMPERATURE: 96.8 F | HEIGHT: 68 IN | BODY MASS INDEX: 29.55 KG/M2 | DIASTOLIC BLOOD PRESSURE: 78 MMHG

## 2023-02-21 DIAGNOSIS — Z23 NEED FOR VACCINATION: ICD-10-CM

## 2023-02-21 DIAGNOSIS — E78.5 DYSLIPIDEMIA: ICD-10-CM

## 2023-02-21 DIAGNOSIS — I10 PRIMARY HYPERTENSION: ICD-10-CM

## 2023-02-21 DIAGNOSIS — Z11.59 NEED FOR HEPATITIS C SCREENING TEST: ICD-10-CM

## 2023-02-21 PROCEDURE — 99214 OFFICE O/P EST MOD 30 MIN: CPT | Mod: 25 | Performed by: NURSE PRACTITIONER

## 2023-02-21 PROCEDURE — 90746 HEPB VACCINE 3 DOSE ADULT IM: CPT | Performed by: NURSE PRACTITIONER

## 2023-02-21 PROCEDURE — 90471 IMMUNIZATION ADMIN: CPT | Performed by: NURSE PRACTITIONER

## 2023-02-21 ASSESSMENT — FIBROSIS 4 INDEX: FIB4 SCORE: 0.91

## 2023-02-21 ASSESSMENT — PATIENT HEALTH QUESTIONNAIRE - PHQ9: CLINICAL INTERPRETATION OF PHQ2 SCORE: 0

## 2023-02-21 NOTE — LETTER
[facility]  ARNAUD SalazarRSULEMA  [address]  [place] [address]  Fax: [phone]   Authorization for Release/Disclosure of   Protected Health Information   Name: KI PHAM : 1965 SSN: [id number]   Address: [address] [place] [address] [place] [address] Phone:    [phone] (home)    I authorize the entity listed below to release/disclose the PHI below to:   [facility]/FERNY Salazar and FERNY Salazar   Provider or Entity Name:  [facility]    Address   City, State, Zip   Phone:      Fax:     Reason for request: continuity of care   Information to be released:    [ x ] LAST COLONOSCOPY,  including any PATH REPORT and follow-up  [  ] LAST FIT/COLOGUARD RESULT [  ] LAST DEXA  [  ] LAST MAMMOGRAM  [  ] LAST PAP  [  ] LAST LABS [  ] RETINA EXAM REPORT  [  ] IMMUNIZATION RECORDS  [  ] Release all info      [  ] Check here and initial the line next to each item to release ALL health information INCLUDING  _____ Care and treatment for drug and / or alcohol abuse  _____ HIV testing, infection status, or AIDS  _____ Genetic Testing    DATES OF SERVICE OR TIME PERIOD TO BE DISCLOSED: _____________  I understand and acknowledge that:  * This Authorization may be revoked at any time by you in writing, except if your health information has already been used or disclosed.  * Your health information that will be used or disclosed as a result of you signing this authorization could be re-disclosed by the recipient. If this occurs, your re-disclosed health information may no longer be protected by State or Federal laws.  * You may refuse to sign this Authorization. Your refusal will not affect your ability to obtain treatment.  * This Authorization becomes effective upon signing and will  on (date) __________.      If no date is indicated, this Authorization will  one (1) year from the signature date.    Name: Ki Pham    Signature:    Date:     2/21/2023       PLEASE FAX REQUESTED RECORDS BACK TO: [phone]

## 2023-02-21 NOTE — ASSESSMENT & PLAN NOTE
His blood pressure today is 126/78.  He continues with losartan 25 mg daily.  He is monitoring his blood pressure at home and gets readings 130/80's.  He can have occasional blood pressures in 140. Denies non-productive cough, chest pain, shortness of breath, dizziness, blurry vision or headache.

## 2023-02-21 NOTE — ASSESSMENT & PLAN NOTE
He continues with atorvastatin 10 mg nightly.  He continues with walking on the weekends for 60 minutes 2 days a week. He does lots of walking at work. He is doing 25 pushups daily. His wife has him on an all organic diet. He cooks his meals at home.

## 2023-02-21 NOTE — LETTER
[facility]  FERNY Salazar  [address]  [place] [address]  Fax: [phone]   Authorization for Release/Disclosure of   Protected Health Information   Name: MICHAEL PHAM : 1965 SSN: [id number]   Address: [address] [place], [place] [address] [place] [address] Phone:    [phone] (home)    I authorize the entity listed below to release/disclose the PHI below to:   [facility]/FERNY Salazar and FERNY Salazar   Provider or Entity Name:  [facility]    Address   City, State, [facility]   Phone:  Phone: [phone]      Fax:  Fax: [phone]   Reason for request: continuity of care   Information to be released:    [ x ] LAST COLONOSCOPY,  including any PATH REPORT and follow-up  [  ] LAST FIT/COLOGUARD RESULT [  ] LAST DEXA  [  ] LAST MAMMOGRAM  [  ] LAST PAP  [  ] LAST LABS [  ] RETINA EXAM REPORT  [  ] IMMUNIZATION RECORDS  [  ] Release all info      [  ] Check here and initial the line next to each item to release ALL health information INCLUDING  _____ Care and treatment for drug and / or alcohol abuse  _____ HIV testing, infection status, or AIDS  _____ Genetic Testing    DATES OF SERVICE OR TIME PERIOD TO BE DISCLOSED: _____________  I understand and acknowledge that:  * This Authorization may be revoked at any time by you in writing, except if your health information has already been used or disclosed.  * Your health information that will be used or disclosed as a result of you signing this authorization could be re-disclosed by the recipient. If this occurs, your re-disclosed health information may no longer be protected by State or Federal laws.  * You may refuse to sign this Authorization. Your refusal will not affect your ability to obtain treatment.  * This Authorization becomes effective upon signing and will  on (date) __________.      If no date is indicated, this Authorization will  one (1) year from the signature date.    Name:  Ki Melo    Signature:   Date:     2/21/2023       PLEASE FAX REQUESTED RECORDS BACK TO: [phone]

## 2023-02-21 NOTE — PROGRESS NOTES
"Subjective:     CC: Hypertension follow-up    HPI:   Ki presents today with the following:    Primary hypertension  His blood pressure today is 126/78.  He continues with losartan 25 mg daily.  He is monitoring his blood pressure at home and gets readings 130/80's.  He can have occasional blood pressures in 140. Denies non-productive cough, chest pain, shortness of breath, dizziness, blurry vision or headache.     Dyslipidemia  He continues with atorvastatin 10 mg nightly.  He continues with walking on the weekends for 60 minutes 2 days a week. He does lots of walking at work. He is doing 25 pushups daily. His wife has him on an all organic diet. He cooks his meals at home.     Past Medical History:   Diagnosis Date    Heart burn     High cholesterol     Insomnia 7/6/2018       Social History     Tobacco Use    Smoking status: Never    Smokeless tobacco: Never   Vaping Use    Vaping Use: Never used   Substance Use Topics    Alcohol use: Yes     Comment: 3 drinks each day on weekend    Drug use: Yes     Frequency: 2.0 times per week     Types: Marijuana, Inhaled     Comment: joints qhs on weekends       Current Outpatient Medications Ordered in Epic   Medication Sig Dispense Refill    losartan (COZAAR) 25 MG Tab Take 1 Tablet by mouth every day. 90 Tablet 3    atorvastatin (LIPITOR) 10 MG Tab Take 1 Tablet by mouth every evening. 90 Tablet 3     No current Epic-ordered facility-administered medications on file.       Allergies:  Patient has no known allergies.    Health Maintenance: Reviewed.  He states that he had repeat colonoscopy on 8/19/[facility] at [facility], we will request records.      Objective:     Vital signs reviewed  Exam:  /78 (BP Location: Left arm, Patient Position: Sitting, BP Cuff Size: Adult)   Pulse 65   Temp 36 °C (96.8 °F) (Temporal)   Ht 1.727 m (5' 8\")   Wt 88.5 kg (195 lb)   SpO2 98%   BMI 29.65 kg/m²  Body mass index is 29.65 kg/m².    Gen: Alert and oriented, No apparent " distress.  Eyes:   Lids normal. Glasses in place.   Neck: Neck is supple without lymphadenopathy.  Lungs: Normal effort, CTA bilaterally, no wheezes, rhonchi, or rales  CV: Regular rate and rhythm. No murmurs, rubs, or gallops.    Assessment & Plan:     57 y.o. male with the following -     1. Primary hypertension  Chronic stable problem.  Blood pressure is at goal today.  He will continue with losartan 25 mg daily.  Continue home blood pressure monitoring.  Continue healthy diet and exercise.  Return in 6 months for follow-up.  - CBC WITH DIFFERENTIAL; Future  - Comp Metabolic Panel; Future    2. Dyslipidemia  Chronic stable problem.  Due for updated labs.  He will continue with atorvastatin 10 mg nightly.  Continue with exercise and healthy diet.   - Lipid Profile; Future    3. Need for hepatitis C screening test  Acute uncomplicated problem.  Discussed hepatitis C screening and he is in agreement.  Order placed.  - HEP C VIRUS ANTIBODY; Future    4. Need for vaccination  Acute uncomplicated problem.  Discussed hepatitis B vaccination and he is in agreement. I have placed the below orders and discussed them with an approved delegating provider. The MA is performing the below orders under the direction of Dr. Ignacio.  - Hep B Adult 20+        Return in about 6 months (around 8/21/2023) for cholesterol, Hypertension.    Please note that this dictation was created using voice recognition software. I have made every reasonable attempt to correct obvious errors, but I expect that there are errors of grammar and possibly content that I did not discover before finalizing the note.

## 2023-03-18 ENCOUNTER — HOSPITAL ENCOUNTER (OUTPATIENT)
Dept: LAB | Facility: MEDICAL CENTER | Age: 58
End: 2023-03-18
Attending: NURSE PRACTITIONER
Payer: COMMERCIAL

## 2023-03-18 DIAGNOSIS — Z11.59 NEED FOR HEPATITIS C SCREENING TEST: ICD-10-CM

## 2023-03-18 DIAGNOSIS — I10 PRIMARY HYPERTENSION: ICD-10-CM

## 2023-03-18 DIAGNOSIS — E78.5 DYSLIPIDEMIA: ICD-10-CM

## 2023-03-18 LAB
ALBUMIN SERPL BCP-MCNC: 4.6 G/DL (ref 3.2–4.9)
ALBUMIN/GLOB SERPL: 1.4 G/DL
ALP SERPL-CCNC: 54 U/L (ref 30–99)
ALT SERPL-CCNC: 34 U/L (ref 2–50)
ANION GAP SERPL CALC-SCNC: 14 MMOL/L (ref 7–16)
AST SERPL-CCNC: 27 U/L (ref 12–45)
BASOPHILS # BLD AUTO: 0.8 % (ref 0–1.8)
BASOPHILS # BLD: 0.05 K/UL (ref 0–0.12)
BILIRUB SERPL-MCNC: 0.7 MG/DL (ref 0.1–1.5)
BUN SERPL-MCNC: 19 MG/DL (ref 8–22)
CALCIUM ALBUM COR SERPL-MCNC: 9.1 MG/DL (ref 8.5–10.5)
CALCIUM SERPL-MCNC: 9.6 MG/DL (ref 8.5–10.5)
CHLORIDE SERPL-SCNC: 105 MMOL/L (ref 96–112)
CHOLEST SERPL-MCNC: 173 MG/DL (ref 100–199)
CO2 SERPL-SCNC: 22 MMOL/L (ref 20–33)
CREAT SERPL-MCNC: 1.14 MG/DL (ref 0.5–1.4)
EOSINOPHIL # BLD AUTO: 0.16 K/UL (ref 0–0.51)
EOSINOPHIL NFR BLD: 2.5 % (ref 0–6.9)
ERYTHROCYTE [DISTWIDTH] IN BLOOD BY AUTOMATED COUNT: 40.3 FL (ref 35.9–50)
FASTING STATUS PATIENT QL REPORTED: NORMAL
GFR SERPLBLD CREATININE-BSD FMLA CKD-EPI: 75 ML/MIN/1.73 M 2
GLOBULIN SER CALC-MCNC: 3.4 G/DL (ref 1.9–3.5)
GLUCOSE SERPL-MCNC: 93 MG/DL (ref 65–99)
HCT VFR BLD AUTO: 44.8 % (ref 42–52)
HCV AB SER QL: NORMAL
HDLC SERPL-MCNC: 61 MG/DL
HGB BLD-MCNC: 15.6 G/DL (ref 14–18)
IMM GRANULOCYTES # BLD AUTO: 0.03 K/UL (ref 0–0.11)
IMM GRANULOCYTES NFR BLD AUTO: 0.5 % (ref 0–0.9)
LDLC SERPL CALC-MCNC: 95 MG/DL
LYMPHOCYTES # BLD AUTO: 2.2 K/UL (ref 1–4.8)
LYMPHOCYTES NFR BLD: 34.2 % (ref 22–41)
MCH RBC QN AUTO: 30.7 PG (ref 27–33)
MCHC RBC AUTO-ENTMCNC: 34.8 G/DL (ref 33.7–35.3)
MCV RBC AUTO: 88.2 FL (ref 81.4–97.8)
MONOCYTES # BLD AUTO: 0.49 K/UL (ref 0–0.85)
MONOCYTES NFR BLD AUTO: 7.6 % (ref 0–13.4)
NEUTROPHILS # BLD AUTO: 3.51 K/UL (ref 1.82–7.42)
NEUTROPHILS NFR BLD: 54.4 % (ref 44–72)
NRBC # BLD AUTO: 0 K/UL
NRBC BLD-RTO: 0 /100 WBC
PLATELET # BLD AUTO: 201 K/UL (ref 164–446)
PMV BLD AUTO: 10.9 FL (ref 9–12.9)
POTASSIUM SERPL-SCNC: 4.2 MMOL/L (ref 3.6–5.5)
PROT SERPL-MCNC: 8 G/DL (ref 6–8.2)
RBC # BLD AUTO: 5.08 M/UL (ref 4.7–6.1)
SODIUM SERPL-SCNC: 141 MMOL/L (ref 135–145)
TRIGL SERPL-MCNC: 83 MG/DL (ref 0–149)
WBC # BLD AUTO: 6.4 K/UL (ref 4.8–10.8)

## 2023-03-18 PROCEDURE — 86803 HEPATITIS C AB TEST: CPT

## 2023-03-18 PROCEDURE — 85025 COMPLETE CBC W/AUTO DIFF WBC: CPT

## 2023-03-18 PROCEDURE — 80053 COMPREHEN METABOLIC PANEL: CPT

## 2023-03-18 PROCEDURE — 36415 COLL VENOUS BLD VENIPUNCTURE: CPT

## 2023-03-18 PROCEDURE — 80061 LIPID PANEL: CPT

## 2023-03-20 ENCOUNTER — NON-PROVIDER VISIT (OUTPATIENT)
Dept: MEDICAL GROUP | Facility: PHYSICIAN GROUP | Age: 58
End: 2023-03-20
Payer: COMMERCIAL

## 2023-03-20 DIAGNOSIS — Z23 NEED FOR VACCINATION: ICD-10-CM

## 2023-03-20 PROCEDURE — 90746 HEPB VACCINE 3 DOSE ADULT IM: CPT | Performed by: NURSE PRACTITIONER

## 2023-03-20 PROCEDURE — 90471 IMMUNIZATION ADMIN: CPT | Performed by: NURSE PRACTITIONER

## 2023-03-20 NOTE — PROGRESS NOTES
"Raj Melo is a 57 y.o. male here for a non-provider visit for:   HEPATITIS B 2 of 3    Reason for immunization: continue or complete series started at the office  Immunization records indicate need for vaccine: Yes, confirmed with Epic  Minimum interval has been met for this vaccine: Yes  ABN completed: Not Indicated    VIS Dated  10/15/21 was given to patient: Yes  All IAC Questionnaire questions were answered \"No.\"    Patient tolerated injection and no adverse effects were observed or reported: Yes    Pt scheduled for next dose in series: Yes    "

## 2023-08-22 ENCOUNTER — OFFICE VISIT (OUTPATIENT)
Dept: MEDICAL GROUP | Facility: PHYSICIAN GROUP | Age: 58
End: 2023-08-22
Payer: COMMERCIAL

## 2023-08-22 VITALS
BODY MASS INDEX: 29.7 KG/M2 | DIASTOLIC BLOOD PRESSURE: 72 MMHG | WEIGHT: 196 LBS | TEMPERATURE: 96.8 F | HEIGHT: 68 IN | OXYGEN SATURATION: 99 % | SYSTOLIC BLOOD PRESSURE: 126 MMHG | HEART RATE: 82 BPM

## 2023-08-22 DIAGNOSIS — I10 PRIMARY HYPERTENSION: ICD-10-CM

## 2023-08-22 DIAGNOSIS — Z23 NEED FOR VACCINATION: ICD-10-CM

## 2023-08-22 DIAGNOSIS — E78.5 DYSLIPIDEMIA: ICD-10-CM

## 2023-08-22 DIAGNOSIS — Z00.00 PREVENTATIVE HEALTH CARE: ICD-10-CM

## 2023-08-22 PROCEDURE — 99214 OFFICE O/P EST MOD 30 MIN: CPT | Mod: 25 | Performed by: NURSE PRACTITIONER

## 2023-08-22 PROCEDURE — 90746 HEPB VACCINE 3 DOSE ADULT IM: CPT | Performed by: NURSE PRACTITIONER

## 2023-08-22 PROCEDURE — 3074F SYST BP LT 130 MM HG: CPT | Performed by: NURSE PRACTITIONER

## 2023-08-22 PROCEDURE — 90471 IMMUNIZATION ADMIN: CPT | Performed by: NURSE PRACTITIONER

## 2023-08-22 PROCEDURE — 3078F DIAST BP <80 MM HG: CPT | Performed by: NURSE PRACTITIONER

## 2023-08-22 RX ORDER — LOSARTAN POTASSIUM 25 MG/1
25 TABLET ORAL DAILY
Qty: 90 TABLET | Refills: 3 | Status: SHIPPED | OUTPATIENT
Start: 2023-08-22 | End: 2024-02-22 | Stop reason: SDUPTHER

## 2023-08-22 RX ORDER — ATORVASTATIN CALCIUM 10 MG/1
10 TABLET, FILM COATED ORAL NIGHTLY
Qty: 90 TABLET | Refills: 3 | Status: SHIPPED | OUTPATIENT
Start: 2023-08-22 | End: 2024-02-22 | Stop reason: SDUPTHER

## 2023-08-22 ASSESSMENT — FIBROSIS 4 INDEX: FIB4 SCORE: 1.29

## 2023-08-22 NOTE — ASSESSMENT & PLAN NOTE
His blood pressure today is 126/72.  Continues losartan 25 mg daily.  At home when he checks his blood pressure is 132/78, no blood pressures >140/90. Denies chest pain, shortness of breath or dizziness.

## 2023-08-22 NOTE — PROGRESS NOTES
"Subjective:     CC: Hypertension follow-up    HPI:   Ki presents today with the following:    Primary hypertension  His blood pressure today is 126/72.  Continues losartan 25 mg daily.  At home when he checks his blood pressure is 132/78, no blood pressures >140/90. Denies chest pain, shortness of breath or dizziness.    Dyslipidemia  March 2023 labs show controlled cholesterol.  Continues atorvastatin 10 mg nightly. He is walking for exercise. He is on an organic diet. Red meat is once a week. He is eating daily salad with lunch.     Past Medical History:   Diagnosis Date    Heart burn     High cholesterol     Insomnia 7/6/2018       Social History     Tobacco Use    Smoking status: Never    Smokeless tobacco: Never   Vaping Use    Vaping Use: Never used   Substance Use Topics    Alcohol use: Yes     Comment: 3 drinks each day on weekend    Drug use: Yes     Frequency: 2.0 times per week     Types: Marijuana, Inhaled     Comment: joints qhs on weekends       Current Outpatient Medications Ordered in Epic   Medication Sig Dispense Refill    Multiple Vitamins-Minerals (MENS MULTIPLUS PO) Take  by mouth.      losartan (COZAAR) 25 MG Tab Take 1 Tablet by mouth every day. 90 Tablet 3    atorvastatin (LIPITOR) 10 MG Tab Take 1 Tablet by mouth every evening. 90 Tablet 3     No current Epic-ordered facility-administered medications on file.       Allergies:  Patient has no known allergies.    Health Maintenance: Reviewed.  Due for third dose of hepatitis B vaccine.      Objective:     Vital signs reviewed  Exam:  /72 (BP Location: Right arm, Patient Position: Sitting, BP Cuff Size: Adult)   Pulse 82   Temp 36 °C (96.8 °F) (Temporal)   Ht 1.727 m (5' 8\")   Wt 88.9 kg (196 lb)   SpO2 99%   BMI 29.80 kg/m²  Body mass index is 29.8 kg/m².    Gen: Alert and oriented, No apparent distress.  Eyes:   Lids normal. Glasses in place.   Lungs: Normal effort, CTA bilaterally, no wheezes, rhonchi, or rales  CV: Regular " rate and rhythm. No murmurs, rubs, or gallops.  Ext: No clubbing, cyanosis, edema.    Assessment & Plan:     57 y.o. male with the following -     1. Primary hypertension  Chronic stable problem.  Continue losartan 25 mg daily.  Continue home blood pressure monitoring.  Continue healthy diet and exercise.  - losartan (COZAAR) 25 MG Tab; Take 1 Tablet by mouth every day.  Dispense: 90 Tablet; Refill: 3    2. Dyslipidemia  Chronic stable problem.  Continue atorvastatin 10 mg every evening.  Continue healthy diet and exercise.  - atorvastatin (LIPITOR) 10 MG Tab; Take 1 Tablet by mouth every evening.  Dispense: 90 Tablet; Refill: 3    3. Need for vaccination  Acute complicated problem.  Due for hepatitis B vaccine today. I have placed the below orders and discussed them with an approved delegating provider. The MA is performing the below orders under the direction of Dr. Ignacio.  - Hepatitis B Vaccine Adult IM    4. Preventative health care  Acute uncomplicated problem.  Will be due for annual labs before upcoming February 2024 appointment.  Orders placed today.  - CBC WITH DIFFERENTIAL; Future  - Comp Metabolic Panel; Future  - Lipid Profile; Future      Return in about 6 months (around 2/22/2024) for annual, Labs.    Please note that this dictation was created using voice recognition software. I have made every reasonable attempt to correct obvious errors, but I expect that there are errors of grammar and possibly content that I did not discover before finalizing the note.

## 2023-08-22 NOTE — ASSESSMENT & PLAN NOTE
March 2023 labs show controlled cholesterol.  Continues atorvastatin 10 mg nightly. He is walking for exercise. He is on an organic diet. Red meat is once a week. He is eating daily salad with lunch.

## 2023-10-27 ENCOUNTER — OFFICE VISIT (OUTPATIENT)
Dept: URGENT CARE | Facility: PHYSICIAN GROUP | Age: 58
End: 2023-10-27
Payer: COMMERCIAL

## 2023-10-27 VITALS
SYSTOLIC BLOOD PRESSURE: 122 MMHG | BODY MASS INDEX: 28.79 KG/M2 | OXYGEN SATURATION: 98 % | DIASTOLIC BLOOD PRESSURE: 88 MMHG | HEART RATE: 78 BPM | TEMPERATURE: 97.7 F | WEIGHT: 190 LBS | RESPIRATION RATE: 16 BRPM | HEIGHT: 68 IN

## 2023-10-27 DIAGNOSIS — M53.3 SACRO ILIAL PAIN: ICD-10-CM

## 2023-10-27 PROCEDURE — 3074F SYST BP LT 130 MM HG: CPT | Performed by: FAMILY MEDICINE

## 2023-10-27 PROCEDURE — 99213 OFFICE O/P EST LOW 20 MIN: CPT | Performed by: FAMILY MEDICINE

## 2023-10-27 PROCEDURE — 3079F DIAST BP 80-89 MM HG: CPT | Performed by: FAMILY MEDICINE

## 2023-10-27 RX ORDER — CYCLOBENZAPRINE HCL 10 MG
10 TABLET ORAL 3 TIMES DAILY PRN
Qty: 30 TABLET | Refills: 0 | Status: SHIPPED | OUTPATIENT
Start: 2023-10-27 | End: 2024-02-22

## 2023-10-27 ASSESSMENT — ENCOUNTER SYMPTOMS: BACK PAIN: 1

## 2023-10-27 ASSESSMENT — FIBROSIS 4 INDEX: FIB4 SCORE: 1.31

## 2023-10-27 NOTE — PROGRESS NOTES
"Subjective:   Ki Melo is a 58 y.o. male who presents for Back Pain (Lower back px that radiates down to legs )      Back Pain        Review of Systems   Musculoskeletal:  Positive for back pain.       Medications, Allergies, and current problem list reviewed today in Epic.     Objective:     /88   Pulse 78   Temp 36.5 °C (97.7 °F) (Temporal)   Resp 16   Ht 1.727 m (5' 8\")   Wt 86.2 kg (190 lb)   SpO2 98%     Physical Exam  Vitals and nursing note reviewed.   Musculoskeletal:      Comments: No vertebral pain, sacroiliac pain on the left, neg straight leg, 5/5 strength, some muscle pain down the buttock area.         Assessment/Plan:     Diagnosis and associated orders:     1. Sacro ilial pain  cyclobenzaprine (FLEXERIL) 10 mg Tab         Comments/MDM:     Chiropractic manipulation  alieve         Differential diagnosis, natural history, supportive care, and indications for immediate follow-up discussed.    Advised the patient to follow-up with the primary care physician for recheck, reevaluation, and consideration of further management.    Please note that this dictation was created using voice recognition software. I have made a reasonable attempt to correct obvious errors, but I expect that there are errors of grammar and possibly content that I did not discover before finalizing the note.    This note was electronically signed by Leonel Langston M.D.  "

## 2023-11-06 ENCOUNTER — APPOINTMENT (OUTPATIENT)
Dept: MEDICAL GROUP | Facility: PHYSICIAN GROUP | Age: 58
End: 2023-11-06
Payer: COMMERCIAL

## 2023-12-06 LAB
BASOPHILS # BLD AUTO: 0.1 X10E3/UL (ref 0–0.2)
BASOPHILS NFR BLD AUTO: 1 %
EOSINOPHIL # BLD AUTO: 0.2 X10E3/UL (ref 0–0.4)
EOSINOPHIL NFR BLD AUTO: 3 %
ERYTHROCYTE [DISTWIDTH] IN BLOOD BY AUTOMATED COUNT: 12.4 % (ref 11.6–15.4)
HCT VFR BLD AUTO: 45.8 % (ref 37.5–51)
HGB BLD-MCNC: 15.6 G/DL (ref 13–17.7)
IMM GRANULOCYTES # BLD AUTO: 0.1 X10E3/UL (ref 0–0.1)
IMM GRANULOCYTES NFR BLD AUTO: 1 %
IMMATURE CELLS  115398: NORMAL
LYMPHOCYTES # BLD AUTO: 2.8 X10E3/UL (ref 0.7–3.1)
LYMPHOCYTES NFR BLD AUTO: 39 %
MCH RBC QN AUTO: 30.2 PG (ref 26.6–33)
MCHC RBC AUTO-ENTMCNC: 34.1 G/DL (ref 31.5–35.7)
MCV RBC AUTO: 89 FL (ref 79–97)
MONOCYTES # BLD AUTO: 0.5 X10E3/UL (ref 0.1–0.9)
MONOCYTES NFR BLD AUTO: 7 %
MORPHOLOGY BLD-IMP: NORMAL
NEUTROPHILS # BLD AUTO: 3.5 X10E3/UL (ref 1.4–7)
NEUTROPHILS NFR BLD AUTO: 49 %
NRBC BLD AUTO-RTO: NORMAL %
PLATELET # BLD AUTO: 248 X10E3/UL (ref 150–450)
RBC # BLD AUTO: 5.17 X10E6/UL (ref 4.14–5.8)
WBC # BLD AUTO: 7.1 X10E3/UL (ref 3.4–10.8)

## 2023-12-10 SDOH — HEALTH STABILITY: PHYSICAL HEALTH: ON AVERAGE, HOW MANY MINUTES DO YOU ENGAGE IN EXERCISE AT THIS LEVEL?: 60 MIN

## 2023-12-10 SDOH — HEALTH STABILITY: PHYSICAL HEALTH: ON AVERAGE, HOW MANY DAYS PER WEEK DO YOU ENGAGE IN MODERATE TO STRENUOUS EXERCISE (LIKE A BRISK WALK)?: 3 DAYS

## 2023-12-10 SDOH — ECONOMIC STABILITY: FOOD INSECURITY: WITHIN THE PAST 12 MONTHS, YOU WORRIED THAT YOUR FOOD WOULD RUN OUT BEFORE YOU GOT MONEY TO BUY MORE.: NEVER TRUE

## 2023-12-10 SDOH — ECONOMIC STABILITY: INCOME INSECURITY: HOW HARD IS IT FOR YOU TO PAY FOR THE VERY BASICS LIKE FOOD, HOUSING, MEDICAL CARE, AND HEATING?: NOT VERY HARD

## 2023-12-10 SDOH — ECONOMIC STABILITY: HOUSING INSECURITY: IN THE LAST 12 MONTHS, HOW MANY PLACES HAVE YOU LIVED?: 1

## 2023-12-10 SDOH — ECONOMIC STABILITY: INCOME INSECURITY: IN THE LAST 12 MONTHS, WAS THERE A TIME WHEN YOU WERE NOT ABLE TO PAY THE MORTGAGE OR RENT ON TIME?: NO

## 2023-12-10 SDOH — ECONOMIC STABILITY: FOOD INSECURITY: WITHIN THE PAST 12 MONTHS, THE FOOD YOU BOUGHT JUST DIDN'T LAST AND YOU DIDN'T HAVE MONEY TO GET MORE.: NEVER TRUE

## 2023-12-10 ASSESSMENT — LIFESTYLE VARIABLES
HOW MANY STANDARD DRINKS CONTAINING ALCOHOL DO YOU HAVE ON A TYPICAL DAY: 3 OR 4
AUDIT-C TOTAL SCORE: 4
SKIP TO QUESTIONS 9-10: 0
HOW OFTEN DO YOU HAVE SIX OR MORE DRINKS ON ONE OCCASION: NEVER
HOW OFTEN DO YOU HAVE A DRINK CONTAINING ALCOHOL: 2-3 TIMES A WEEK

## 2023-12-10 ASSESSMENT — SOCIAL DETERMINANTS OF HEALTH (SDOH)
IN A TYPICAL WEEK, HOW MANY TIMES DO YOU TALK ON THE PHONE WITH FAMILY, FRIENDS, OR NEIGHBORS?: THREE TIMES A WEEK
HOW OFTEN DO YOU ATTEND CHURCH OR RELIGIOUS SERVICES?: NEVER
IN A TYPICAL WEEK, HOW MANY TIMES DO YOU TALK ON THE PHONE WITH FAMILY, FRIENDS, OR NEIGHBORS?: THREE TIMES A WEEK
HOW OFTEN DO YOU ATTENT MEETINGS OF THE CLUB OR ORGANIZATION YOU BELONG TO?: NEVER
HOW OFTEN DO YOU HAVE SIX OR MORE DRINKS ON ONE OCCASION: NEVER
HOW OFTEN DO YOU GET TOGETHER WITH FRIENDS OR RELATIVES?: ONCE A WEEK
HOW OFTEN DO YOU ATTENT MEETINGS OF THE CLUB OR ORGANIZATION YOU BELONG TO?: NEVER
HOW OFTEN DO YOU ATTEND CHURCH OR RELIGIOUS SERVICES?: NEVER
HOW MANY DRINKS CONTAINING ALCOHOL DO YOU HAVE ON A TYPICAL DAY WHEN YOU ARE DRINKING: 3 OR 4
WITHIN THE PAST 12 MONTHS, YOU WORRIED THAT YOUR FOOD WOULD RUN OUT BEFORE YOU GOT THE MONEY TO BUY MORE: NEVER TRUE
HOW HARD IS IT FOR YOU TO PAY FOR THE VERY BASICS LIKE FOOD, HOUSING, MEDICAL CARE, AND HEATING?: NOT VERY HARD
HOW OFTEN DO YOU HAVE A DRINK CONTAINING ALCOHOL: 2-3 TIMES A WEEK
DO YOU BELONG TO ANY CLUBS OR ORGANIZATIONS SUCH AS CHURCH GROUPS UNIONS, FRATERNAL OR ATHLETIC GROUPS, OR SCHOOL GROUPS?: NO
HOW OFTEN DO YOU GET TOGETHER WITH FRIENDS OR RELATIVES?: ONCE A WEEK
DO YOU BELONG TO ANY CLUBS OR ORGANIZATIONS SUCH AS CHURCH GROUPS UNIONS, FRATERNAL OR ATHLETIC GROUPS, OR SCHOOL GROUPS?: NO

## 2023-12-12 ENCOUNTER — APPOINTMENT (OUTPATIENT)
Dept: RADIOLOGY | Facility: IMAGING CENTER | Age: 58
End: 2023-12-12
Attending: FAMILY MEDICINE
Payer: COMMERCIAL

## 2023-12-12 ENCOUNTER — OFFICE VISIT (OUTPATIENT)
Dept: SPORTS MEDICINE | Facility: CLINIC | Age: 58
End: 2023-12-12
Payer: COMMERCIAL

## 2023-12-12 VITALS
TEMPERATURE: 97.2 F | DIASTOLIC BLOOD PRESSURE: 76 MMHG | HEIGHT: 68 IN | HEART RATE: 78 BPM | OXYGEN SATURATION: 96 % | WEIGHT: 190 LBS | RESPIRATION RATE: 16 BRPM | BODY MASS INDEX: 28.79 KG/M2 | SYSTOLIC BLOOD PRESSURE: 122 MMHG

## 2023-12-12 DIAGNOSIS — M54.50 LUMBAR SPINE PAIN: ICD-10-CM

## 2023-12-12 DIAGNOSIS — M54.17 LUMBOSACRAL RADICULOPATHY: ICD-10-CM

## 2023-12-12 PROCEDURE — 3074F SYST BP LT 130 MM HG: CPT | Performed by: FAMILY MEDICINE

## 2023-12-12 PROCEDURE — 3078F DIAST BP <80 MM HG: CPT | Performed by: FAMILY MEDICINE

## 2023-12-12 PROCEDURE — 99214 OFFICE O/P EST MOD 30 MIN: CPT | Performed by: FAMILY MEDICINE

## 2023-12-12 PROCEDURE — 72100 X-RAY EXAM L-S SPINE 2/3 VWS: CPT | Mod: TC | Performed by: PHYSICIAN ASSISTANT

## 2023-12-12 ASSESSMENT — FIBROSIS 4 INDEX: FIB4 SCORE: 1.06

## 2023-12-12 ASSESSMENT — ENCOUNTER SYMPTOMS
VOMITING: 0
CHILLS: 0
SHORTNESS OF BREATH: 0
DIZZINESS: 0
FEVER: 0
NAUSEA: 0

## 2023-12-12 NOTE — PROGRESS NOTES
Chief Complaint   Patient presents with    Leg Pain     L leg pain & numbness to foot        Subjective     Self-referred for evaluation of LEFT leg pain and numbness   Onset roughly October 25,2023  No specific injury mechanism that he can recall, however he had stepped off of a ladder and missed a rung causing him to fall directly on a extended LEFT  He did not really have any symptoms immediately after that, but he woke the following day with symptoms  Pain is predominantly along the LEFT lumbar spine region  No real pain along the central spine  He does have some radiation to the LEFT thigh and radicular symptoms including numbness which goes all the way down to the LEFT plantar aspect of the LEFT foot  Symptoms have been present pretty much since his incident back in October  Denies any prior lumbar spine injuries or issues  POSITIVE night symptom secondary to his leg/radicular symptoms  The leg feels weaker and he does notice intermittent buckling which feels like it is coming from the hip  Advil for pain which helps  No bowel or bladder incontinence    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   Respiratory:  Negative for shortness of breath.    Cardiovascular:  Negative for chest pain.   Gastrointestinal:  Negative for nausea and vomiting.   Neurological:  Negative for dizziness.     PMH:  has a past medical history of Heart burn, High cholesterol, and Insomnia (7/6/2018).  MEDS:   Current Outpatient Medications:     cyclobenzaprine (FLEXERIL) 10 mg Tab, Take 1 Tablet by mouth 3 times a day as needed for Moderate Pain., Disp: 30 Tablet, Rfl: 0    Multiple Vitamins-Minerals (MENS MULTIPLUS PO), Take  by mouth., Disp: , Rfl:     losartan (COZAAR) 25 MG Tab, Take 1 Tablet by mouth every day., Disp: 90 Tablet, Rfl: 3    atorvastatin (LIPITOR) 10  "MG Tab, Take 1 Tablet by mouth every evening., Disp: 90 Tablet, Rfl: 3  ALLERGIES: No Known Allergies  SURGHX:   Past Surgical History:   Procedure Laterality Date    INGUINAL HERNIA REPAIR BILATERAL Bilateral 9/20/2017    Procedure: INGUINAL HERNIA REPAIR BILATERAL WITH MESH  ;  Surgeon: Ed Ro M.D.;  Location: SURGERY [facility];  Service: General    OTHER ORTHOPEDIC SURGERY  2009    ORIF left wrist    SEPTOTURBINOPLASTY  6/4/08    Performed by ED TIRADO at SURGERY SAME DAY [facility]    OTHER ORTHOPEDIC SURGERY  2007    right knee scope     SOCHX:  reports that he has never smoked. He has never used smokeless tobacco. He reports current alcohol use. He reports current drug use. Frequency: 2.00 times per week. Drugs: Marijuana and Inhaled.  FH: Family history was reviewed, no pertinent findings to report    Objective   /76 (BP Location: Left arm, Patient Position: Sitting, BP Cuff Size: Adult)   Pulse 78   Temp 36.2 °C (97.2 °F) (Temporal)   Resp 16   Ht 1.727 m (5' 8\")   Wt 86.2 kg (190 lb)   SpO2 96%   BMI 28.89 kg/m²     Lumbar spine exam:  No acute distress  Able to walk on heels and toes  Able to flex to 90° without discomfort  Extension and lateral rotation without discomfort  Strength testing with hip flexion, knee flexion and extension, ankle dorsiflexion and plantarflexion, and EHL testing were 5 out of 5 bilaterally  Sensation was intact bilaterally  Slump test is NEGATIVE although it does reproduce some lower LEFT lumbar spine pain   The legs were otherwise neurovascularly intact    HIP EXAM:  NORMAL gait    Right hip: Range of motion is intact  NEGATIVE pain with internal rotation  carlos eduardo's test is NEGATIVE  NO tenderness of the trochanteric bursa  NO tenderness of the gluteus medius  Aneudy's test is NEGATIVE    Left hip: Range of motion is intact  NEGATIVE pain with internal rotation  carlos eduardo's test is NEGATIVE  NO tenderness of the trochanteric bursa  NO tenderness of the " gluteus medius  Aneudy's test is NEGATIVE    1. Lumbar spine pain  DX-LUMBAR SPINE-2 OR 3 VIEWS      2. Lumbosacral radiculopathy  DX-LUMBAR SPINE-2 OR 3 VIEWS        Onset roughly October 25,2023  No specific injury mechanism that he can recall, however he had stepped off of a ladder and missed a rung causing him to fall directly on a extended LEFT  He did not really have any symptoms immediately after that, but he woke the following day with symptoms    Pain has been persistent for greater than 6 weeks  Lumbar spine x-rays demonstrate mild degenerative changes, worse at L5-S1  Otherwise, NORMAL    Recommended hamstring flexibility, core strengthening, Pilates, home exercise program  We provided him with home exercises for SI joint as well as spine  He would like to hold off on formal physical therapy at this time    No follow-ups on file.  To see how he is doing with home exercise program at that point        12/12/2023 12:26 PM     HISTORY/REASON FOR EXAM:  Atraumatic Pain.        TECHNIQUE/ EXAM DESCRIPTION AND NUMBER OF VIEWS:  3 views of the lumbar spine.     COMPARISON: None.     FINDINGS:  The bony trabecular pattern is normal.  The lumbar vertebral bodies have a normal height and alignment. There are scattered age-related degenerative changes of the spine with intervertebral disc space narrowing, endplate spurring and facet arthropathy.     IMPRESSION:     Degenerative changes of the lumbar spine without obvious fracture or acute malalignment.           Exam Ended: 12/12/23 12:36 PM Last Resulted: 12/12/23 12:38 PM           Interpreted in the office today with the patient    Self-referred

## 2023-12-12 NOTE — Clinical Note
Luis Castaneda, I hope this message finds you well.  We saw Khoa in the sports medicine clinic for his lumbar spine and radicular symptoms. Fortunately, his exam is relatively benign. We made some recommendations including pelvic and spine exercises. Will plan on seeing him back in 6 weeks to see how things are coming along. Respectfully,  CARON Meyer M.D. [facility] [place] [phone]

## 2024-01-16 ENCOUNTER — APPOINTMENT (OUTPATIENT)
Dept: MEDICAL GROUP | Facility: PHYSICIAN GROUP | Age: 59
End: 2024-01-16
Payer: COMMERCIAL

## 2024-02-22 ENCOUNTER — OFFICE VISIT (OUTPATIENT)
Dept: MEDICAL GROUP | Facility: PHYSICIAN GROUP | Age: 59
End: 2024-02-22
Payer: COMMERCIAL

## 2024-02-22 VITALS
HEIGHT: 68 IN | BODY MASS INDEX: 30.01 KG/M2 | TEMPERATURE: 97.6 F | WEIGHT: 198 LBS | DIASTOLIC BLOOD PRESSURE: 70 MMHG | SYSTOLIC BLOOD PRESSURE: 120 MMHG | OXYGEN SATURATION: 97 % | HEART RATE: 75 BPM

## 2024-02-22 DIAGNOSIS — L03.032 CELLULITIS OF TOE OF LEFT FOOT: ICD-10-CM

## 2024-02-22 DIAGNOSIS — E66.9 OBESITY (BMI 30-39.9): ICD-10-CM

## 2024-02-22 DIAGNOSIS — Z11.4 SCREENING FOR HIV (HUMAN IMMUNODEFICIENCY VIRUS): ICD-10-CM

## 2024-02-22 DIAGNOSIS — Z13.29 SCREENING FOR ENDOCRINE, METABOLIC AND IMMUNITY DISORDER: ICD-10-CM

## 2024-02-22 DIAGNOSIS — I10 PRIMARY HYPERTENSION: ICD-10-CM

## 2024-02-22 DIAGNOSIS — Z00.01 ANNUAL VISIT FOR GENERAL ADULT MEDICAL EXAMINATION WITH ABNORMAL FINDINGS: ICD-10-CM

## 2024-02-22 DIAGNOSIS — Z13.228 SCREENING FOR ENDOCRINE, METABOLIC AND IMMUNITY DISORDER: ICD-10-CM

## 2024-02-22 DIAGNOSIS — Z13.0 SCREENING FOR ENDOCRINE, METABOLIC AND IMMUNITY DISORDER: ICD-10-CM

## 2024-02-22 DIAGNOSIS — Z12.5 SCREENING FOR PROSTATE CANCER: ICD-10-CM

## 2024-02-22 DIAGNOSIS — E78.5 DYSLIPIDEMIA: ICD-10-CM

## 2024-02-22 PROCEDURE — 3078F DIAST BP <80 MM HG: CPT | Performed by: NURSE PRACTITIONER

## 2024-02-22 PROCEDURE — 99213 OFFICE O/P EST LOW 20 MIN: CPT | Mod: 25 | Performed by: NURSE PRACTITIONER

## 2024-02-22 PROCEDURE — 99396 PREV VISIT EST AGE 40-64: CPT | Performed by: NURSE PRACTITIONER

## 2024-02-22 PROCEDURE — 3074F SYST BP LT 130 MM HG: CPT | Performed by: NURSE PRACTITIONER

## 2024-02-22 RX ORDER — CEPHALEXIN 500 MG/1
500 CAPSULE ORAL 4 TIMES DAILY
Qty: 28 CAPSULE | Refills: 0 | Status: SHIPPED | OUTPATIENT
Start: 2024-02-22 | End: 2024-02-29

## 2024-02-22 RX ORDER — ATORVASTATIN CALCIUM 10 MG/1
10 TABLET, FILM COATED ORAL NIGHTLY
Qty: 90 TABLET | Refills: 3 | Status: SHIPPED | OUTPATIENT
Start: 2024-02-22

## 2024-02-22 RX ORDER — LOSARTAN POTASSIUM 25 MG/1
25 TABLET ORAL DAILY
Qty: 90 TABLET | Refills: 3 | Status: SHIPPED | OUTPATIENT
Start: 2024-02-22

## 2024-02-22 ASSESSMENT — FIBROSIS 4 INDEX: FIB4 SCORE: 1.06

## 2024-02-22 ASSESSMENT — PATIENT HEALTH QUESTIONNAIRE - PHQ9: CLINICAL INTERPRETATION OF PHQ2 SCORE: 0

## 2024-02-22 NOTE — PROGRESS NOTES
Subjective:     CC:   Chief Complaint   Patient presents with    Annual Exam    Ingrown Toenail     Left big toe x 1 wk        HPI:   Ki Melo is a 58 y.o. male who presents for an annual exam. He is feeling well and has no complaints.      Primary hypertension  Blood pressure today 120/70.  Home blood pressures have been 120/70-80's. Today's blood pressure was 136/83 before medication.  Continues losartan 25 mg daily.  Denies chest pain, shortness of breath, dizziness, blurry vision or headache.    Dyslipidemia  Continues atorvastatin 10 mg nightly.  Due for annual labs.  He has an active job and is following on an organic diet.  He is watching his portion size.    Left great toe pain  The left great toenail started having pain and irritation 1 week ago. He was wearing thicker socks and walking boots when pain started, so changed his socks and shoes. History of ingrown toenails as child. He last saw Podiatry as a child. Interventions include none. He has not tried to cut the nail. He has not seen any discharge. The area is red and swollen. The redness is not worsening but not getting better. No fever or chills.      Health Maintenance  Cholesterol Screening: labs ordered   Diabetes Screening: labs ordered  Diet: He is eating organic diet, chicken, daily spinach salad, yogurt and cut down on red meat. He snacks on sweets and snack food.    Exercise: He is walking daily 10,000+ steps and every morning 25 push ups.    Substance Abuse: drinking 6 beers on the weekend. Brief intervention completed  Safe in relationship.   Seat belts safety discussed.  Sun protection used.    Cancer screening  Colorectal Cancer Screenin2022 with 3 year recall    Lung Cancer Screening: n/a    Prostate Cancer Screening/PSA: ordered     Infectious disease screening/Immunizations  --STI Screening: no  --Practices safe sex.  --HIV Screening: ordered today   --Hepatitis C Screening: 3/18/2023   --Immunizations:     Influenza: 9/23/2023    Tetanus: 9/8/2018    Shingles: completed series   Pneumococcal : n/a     Other immunizations: encouraged COVID booster     He  has a past medical history of Heart burn, High cholesterol, and Insomnia (7/6/2018).  He  has a past surgical history that includes septoturbinoplasty (6/4/08); other orthopedic surgery (2009); other orthopedic surgery (2007); and inguinal hernia repair bilateral (Bilateral, 9/20/2017).  Family History   Problem Relation Age of Onset    No Known Problems Mother     Other Father         smoker     Lung Disease Father     No Known Problems Brother     Hypertension Brother      Social History     Tobacco Use    Smoking status: Never    Smokeless tobacco: Never   Vaping Use    Vaping Use: Never used   Substance Use Topics    Alcohol use: Yes     Comment: 3 drinks each day on weekend    Drug use: Yes     Frequency: 2.0 times per week     Types: Marijuana, Inhaled     Comment: joints qhs on weekends-decreased overall       Patient Active Problem List    Diagnosis Date Noted    Obesity (BMI 30-39.9) 02/22/2024    Primary hypertension 03/02/2022    Dyslipidemia 07/06/2018    Insomnia 07/06/2018       Current Outpatient Medications   Medication Sig Dispense Refill    cephALEXin (KEFLEX) 500 MG Cap Take 1 Capsule by mouth 4 times a day for 7 days. 28 Capsule 0    losartan (COZAAR) 25 MG Tab Take 1 Tablet by mouth every day. 90 Tablet 3    atorvastatin (LIPITOR) 10 MG Tab Take 1 Tablet by mouth every evening. 90 Tablet 3    Multiple Vitamins-Minerals (MENS MULTIPLUS PO) Take  by mouth.       No current facility-administered medications for this visit.    (including changes today)  Allergies: Patient has no known allergies.    Review of Systems   Constitutional: Negative for fever, chills and malaise/fatigue.   HENT: Negative for congestion.    Eyes: Negative for pain.   Respiratory: Negative for cough and shortness of breath.    Cardiovascular: Negative for leg swelling.  "  Gastrointestinal: Negative for nausea, vomiting, abdominal pain and diarrhea.   Genitourinary: Negative for dysuria and hematuria.   Skin: Negative for rash.   Neurological: Negative for dizziness, focal weakness and headaches.   Endo/Heme/Allergies: Does not bruise/bleed easily.   Psychiatric/Behavioral: Negative for depression.  The patient is not nervous/anxious.      Objective:     Vital signs reviewed  /70 (BP Location: Right arm, Patient Position: Sitting, BP Cuff Size: Adult)   Pulse 75   Temp 36.4 °C (97.6 °F) (Temporal)   Ht 1.727 m (5' 8\")   Wt 89.8 kg (198 lb)   SpO2 97%   BMI 30.11 kg/m²   Body mass index is 30.11 kg/m².  Wt Readings from Last 4 Encounters:   02/22/24 89.8 kg (198 lb)   12/12/23 86.2 kg (190 lb)   10/27/23 86.2 kg (190 lb)   08/22/23 88.9 kg (196 lb)       Physical Exam:  Constitutional: Well-developed and well-nourished. Not diaphoretic. No distress.   Skin: Skin is warm and dry. No rash noted.  Head: Atraumatic without lesions.  Eyes: Conjunctivae and extraocular motions are normal. Pupils are equal, round, and reactive to light. No scleral icterus. Wears glasses.   Ears:  External ears unremarkable. Tympanic membranes clear and intact.  Nose: Nares patent. Septum midline. Turbinates without erythema nor edema. No discharge.   Mouth/Throat: Dentition is intact. Tongue normal. Oropharynx is clear and moist. Posterior pharynx without erythema or exudates.  Neck: Supple, trachea midline. Normal range of motion. No lymphadenopathy--cervical or supraclavicular.  Cardiovascular: Regular rate and rhythm, S1 and S2 without murmur, rubs, or gallops.    Lungs: Effort normal. Clear to auscultation throughout. No adventitious sounds. No CVA tenderness.  Abdomen: Soft, non tender, and without distention. Active bowel sounds in all four quadrants. No rebound, guarding, masses or HSM.  : Genitalia: deferred  Rectal: deferred  Prostate: deferred  Extremities: No cyanosis, clubbing, " erythema, nor edema. Left pedal pulse 2+. Erythema, swelling, pain,  and warm to touch left medial great toe. No discharge. No fluctuance. Left great toenail appears straight without signs of ingrown nail.   Musculoskeletal: All major joints AROM full in all directions without pain.  Neurological: Alert and oriented x 3.  Psychiatric:  Behavior, mood, and affect are appropriate.        Assessment and Plan:     1. Annual visit for general adult medical examination with abnormal findings  Acute uncomplicated problem.  Annual exam completed today. Discussion today about general wellness and lifestyle habits:  Engage in regular physical and social activities  Skin care, including sunscreen  Recommended annual eye exams and annual dental exams  Discussed wearing seatbelt when in car at all times     2. Cellulitis of toe of left foot  Acute uncomplicated problem.  Discussion today that this will be an additional feeling given today is a annual exam.  He verbalized understanding.  Exam appears more consistent with cellulitis.  Start Keflex 500 mg 4 times daily for the next week.  Keep area clean.  May soak in warm water to keep clean.  Follow-up with podiatry if worsening.  - cephALEXin (KEFLEX) 500 MG Cap; Take 1 Capsule by mouth 4 times a day for 7 days.  Dispense: 28 Capsule; Refill: 0  - Referral to Podiatry    3. Primary hypertension  Chronic stable problem.  Blood pressure is at goal.  Continue losartan 25 mg daily.  Continue home blood pressure monitoring.  Due for labs.  He would like his testosterone level checked.  - Comp Metabolic Panel; Future  - TESTOSTERONE SERUM; Future  - losartan (COZAAR) 25 MG Tab; Take 1 Tablet by mouth every day.  Dispense: 90 Tablet; Refill: 3    4. Dyslipidemia  Chronic stable problem.  Continue atorvastatin 10 mg nightly.  Due for updated labs.  Continue with diet and exercise.  - Comp Metabolic Panel; Future  - Lipid Profile; Future  - atorvastatin (LIPITOR) 10 MG Tab; Take 1 Tablet  by mouth every evening.  Dispense: 90 Tablet; Refill: 3    5. Obesity (BMI 30-39.9)  Chronic stable problem.  Continue healthy diet and exercise.  - Patient identified as having weight management issue.  Appropriate orders and counseling given.    6. Screening for prostate cancer  Acute uncomplicated problem.  Discussed screening and he is in agreement.  - PROSTATE SPECIFIC AG SCREENING; Future    7. Screening for HIV (human immunodeficiency virus)  Acute uncomplicated problem.  Discussed screening and he is in agreement.  - HIV AG/AB COMBO ASSAY SCREENING; Future    8. Screening for endocrine, metabolic and immunity disorder  Acute uncomplicated problem.  He is interested in having testosterone level checked.  - TESTOSTERONE SERUM; Future      HCM: HIV screen ordered.  Labs per orders.  Vaccinations per orders.  Counseling about diet, supplements, exercise, skin care and safe sex.    Follow-up: Return in about 6 months (around 8/22/2024) for Hypertension.      Please note that this dictation was created using voice recognition software. I have made every reasonable attempt to correct obvious errors, but I expect that there are errors of grammar and possibly content that I did not discover before finalizing the note.

## 2024-02-22 NOTE — ASSESSMENT & PLAN NOTE
Continues atorvastatin 10 mg nightly.  Due for annual labs.  He has an active job and is following on an organic diet.  He is watching his portion size.

## 2024-02-22 NOTE — ASSESSMENT & PLAN NOTE
Blood pressure today 120/70.  Home blood pressures have been 120/70-80's. Today's blood pressure was 136/83 before medication.  Continues losartan 25 mg daily.  Denies chest pain, shortness of breath, dizziness, blurry vision or headache.

## 2024-03-22 ENCOUNTER — HOSPITAL ENCOUNTER (OUTPATIENT)
Dept: LAB | Facility: MEDICAL CENTER | Age: 59
End: 2024-03-22
Attending: NURSE PRACTITIONER
Payer: COMMERCIAL

## 2024-03-22 DIAGNOSIS — Z12.5 SCREENING FOR PROSTATE CANCER: ICD-10-CM

## 2024-03-22 DIAGNOSIS — Z13.0 SCREENING FOR ENDOCRINE, METABOLIC AND IMMUNITY DISORDER: ICD-10-CM

## 2024-03-22 DIAGNOSIS — I10 PRIMARY HYPERTENSION: ICD-10-CM

## 2024-03-22 DIAGNOSIS — Z13.29 SCREENING FOR ENDOCRINE, METABOLIC AND IMMUNITY DISORDER: ICD-10-CM

## 2024-03-22 DIAGNOSIS — Z11.4 SCREENING FOR HIV (HUMAN IMMUNODEFICIENCY VIRUS): ICD-10-CM

## 2024-03-22 DIAGNOSIS — Z13.228 SCREENING FOR ENDOCRINE, METABOLIC AND IMMUNITY DISORDER: ICD-10-CM

## 2024-03-22 DIAGNOSIS — E78.5 DYSLIPIDEMIA: ICD-10-CM

## 2024-03-22 LAB
ALBUMIN SERPL BCP-MCNC: 4.5 G/DL (ref 3.2–4.9)
ALBUMIN/GLOB SERPL: 1.4 G/DL
ALP SERPL-CCNC: 59 U/L (ref 30–99)
ALT SERPL-CCNC: 29 U/L (ref 2–50)
ANION GAP SERPL CALC-SCNC: 14 MMOL/L (ref 7–16)
AST SERPL-CCNC: 25 U/L (ref 12–45)
BILIRUB SERPL-MCNC: 0.4 MG/DL (ref 0.1–1.5)
BUN SERPL-MCNC: 16 MG/DL (ref 8–22)
CALCIUM ALBUM COR SERPL-MCNC: 9.2 MG/DL (ref 8.5–10.5)
CALCIUM SERPL-MCNC: 9.6 MG/DL (ref 8.5–10.5)
CHLORIDE SERPL-SCNC: 103 MMOL/L (ref 96–112)
CHOLEST SERPL-MCNC: 225 MG/DL (ref 100–199)
CO2 SERPL-SCNC: 22 MMOL/L (ref 20–33)
CREAT SERPL-MCNC: 1.01 MG/DL (ref 0.5–1.4)
FASTING STATUS PATIENT QL REPORTED: NORMAL
GFR SERPLBLD CREATININE-BSD FMLA CKD-EPI: 86 ML/MIN/1.73 M 2
GLOBULIN SER CALC-MCNC: 3.3 G/DL (ref 1.9–3.5)
GLUCOSE SERPL-MCNC: 92 MG/DL (ref 65–99)
HDLC SERPL-MCNC: 59 MG/DL
HIV 1+2 AB+HIV1 P24 AG SERPL QL IA: NORMAL
LDLC SERPL CALC-MCNC: 145 MG/DL
POTASSIUM SERPL-SCNC: 3.7 MMOL/L (ref 3.6–5.5)
PROT SERPL-MCNC: 7.8 G/DL (ref 6–8.2)
PSA SERPL-MCNC: 0.8 NG/ML (ref 0–4)
SODIUM SERPL-SCNC: 139 MMOL/L (ref 135–145)
TESTOST SERPL-MCNC: 444 NG/DL (ref 175–781)
TRIGL SERPL-MCNC: 107 MG/DL (ref 0–149)

## 2024-03-22 PROCEDURE — 80053 COMPREHEN METABOLIC PANEL: CPT

## 2024-03-22 PROCEDURE — 84403 ASSAY OF TOTAL TESTOSTERONE: CPT

## 2024-03-22 PROCEDURE — 80061 LIPID PANEL: CPT

## 2024-03-22 PROCEDURE — 84153 ASSAY OF PSA TOTAL: CPT

## 2024-03-22 PROCEDURE — 87389 HIV-1 AG W/HIV-1&-2 AB AG IA: CPT

## 2024-03-22 PROCEDURE — 36415 COLL VENOUS BLD VENIPUNCTURE: CPT

## 2024-03-25 ENCOUNTER — PATIENT MESSAGE (OUTPATIENT)
Dept: MEDICAL GROUP | Facility: PHYSICIAN GROUP | Age: 59
End: 2024-03-25
Payer: COMMERCIAL

## 2024-03-25 DIAGNOSIS — E78.5 DYSLIPIDEMIA: ICD-10-CM

## 2024-03-26 NOTE — PROGRESS NOTES
Patient was not taking atorvastatin consistently, resume nightly dose, repeat labs in August 2024.

## 2024-06-14 ENCOUNTER — HOSPITAL ENCOUNTER (OUTPATIENT)
Dept: LAB | Facility: MEDICAL CENTER | Age: 59
End: 2024-06-14
Attending: NURSE PRACTITIONER
Payer: COMMERCIAL

## 2024-06-14 DIAGNOSIS — E78.5 DYSLIPIDEMIA: ICD-10-CM

## 2024-06-14 LAB
CHOLEST SERPL-MCNC: 161 MG/DL (ref 100–199)
FASTING STATUS PATIENT QL REPORTED: NORMAL
HDLC SERPL-MCNC: 58 MG/DL
LDLC SERPL CALC-MCNC: 86 MG/DL
TRIGL SERPL-MCNC: 83 MG/DL (ref 0–149)

## 2024-06-14 PROCEDURE — 80061 LIPID PANEL: CPT

## 2024-06-14 PROCEDURE — 36415 COLL VENOUS BLD VENIPUNCTURE: CPT

## 2024-09-23 DIAGNOSIS — I10 PRIMARY HYPERTENSION: ICD-10-CM

## 2024-09-24 RX ORDER — LOSARTAN POTASSIUM 25 MG/1
25 TABLET ORAL DAILY
Qty: 90 TABLET | Refills: 3 | Status: SHIPPED | OUTPATIENT
Start: 2024-09-24

## 2024-09-24 NOTE — TELEPHONE ENCOUNTER
HISTORY OF PRESENT ILLNESS     CADENCE Bass presents for complete physical exam.  The patient was last seen by me over a year ago.  She has no acute complaints or concerns otherwise today.    PAST MEDICAL, FAMILY AND SOCIAL HISTORY     Patient Active Problem List   Diagnosis   • Essential hypertension with goal blood pressure less than 140/90   • Combined hyperlipidemia   • Tobacco dependence   • GERD without esophagitis   • Hiatal hernia with gastroesophageal reflux   • Stage 3a chronic kidney disease (CMS/HCC)       Current Outpatient Medications   Medication Sig Dispense Refill   • atorvastatin (LIPITOR) 20 MG tablet TAKE 1 TABLET EVERY DAY 90 tablet 3   • omeprazole (PRILOSEC) 40 MG capsule TAKE 1 CAPSULE BY MOUTH EVERY DAY *NOT COVERED BY INS* 90 capsule 3   • lisinopril-hydroCHLOROthiazide (ZESTORETIC) 20-25 MG per tablet Take 1 tablet by mouth daily. 90 tablet 3   • aspirin 81 MG tablet Take 81 mg by mouth daily.       No current facility-administered medications for this visit.        I have reviewed the patient's medications and allergies, past medical, surgical, social and family history, updating these as appropriate.  See histories section of the electronic medical record for a display of this information.    REVIEW OF SYSTEMS     Review of Systems   Constitutional: Negative for chills, diaphoresis and fever.   Respiratory: Negative for cough, shortness of breath and wheezing.    Cardiovascular: Negative for chest pain, palpitations and leg swelling.   Gastrointestinal: Negative for diarrhea, nausea and vomiting.       PHYSICAL EXAM     Visit Vitals  /80   Pulse 88   Ht 5' 3.25\" (1.607 m)   Wt 95.3 kg   LMP 02/08/2021 (Approximate)   BMI 36.91 kg/m²       Physical Exam    RESULTS      Lab Services on 03/19/2021   Component Date Value Ref Range Status   • Fasting Status 03/19/2021 12  Hours Final   • Cholesterol 03/19/2021 176  <=199 mg/dL Final   • Triglycerides 03/19/2021 234* <=149 mg/dL Final   •  Received request via: Pharmacy    Was the patient seen in the last year in this department? Yes    Does the patient have an active prescription (recently filled or refills available) for medication(s) requested? No    Pharmacy Name: Prescription faxed to:    [id number] - [place] - [address] AT [facility] [address] [place] [facility]  [address] [facility] [place] [address]  Phone: [phone] Fax: [phone]    [id number] (INTERNAL USE ONLY) - [place], [place] - [address] [place] [address]  Phone: [phone] Fax: [phone]    [email or web address] - [place], [place] - [address]  [address]  [facility] [address]  Phone: [phone] Fax: [phone]  .    Does the patient have [facility] and need 100-day supply? (This applies to ALL medications) Patient does not have SCP   HDL 03/19/2021 42* >=50 mg/dL Final   • LDL 03/19/2021 87  <=129 mg/dL Final   • Non-HDL Cholesterol 03/19/2021 134  mg/dL Final   • Cholesterol/ HDL Ratio 03/19/2021 4.2  <=4.4 Final   • Fasting Status 03/19/2021 12  Hours Final   • Sodium 03/19/2021 139  135 - 145 mmol/L Final   • Potassium 03/19/2021 4.4  3.4 - 5.1 mmol/L Final   • Chloride 03/19/2021 102  98 - 107 mmol/L Final   • Carbon Dioxide 03/19/2021 25  21 - 32 mmol/L Final   • Anion Gap 03/19/2021 16  10 - 20 mmol/L Final   • Glucose 03/19/2021 87  65 - 99 mg/dL Final   • BUN 03/19/2021 19  6 - 20 mg/dL Final   • Creatinine 03/19/2021 1.24* 0.51 - 0.95 mg/dL Final   • Glomerular Filtration Rate 03/19/2021 51* >90 mL/min/1.73m2 Final   • BUN/ Creatinine Ratio 03/19/2021 15  7 - 25 Final   • Calcium 03/19/2021 9.3  8.4 - 10.2 mg/dL Final   • Bilirubin, Total 03/19/2021 0.3  0.2 - 1.0 mg/dL Final   • GOT/AST 03/19/2021 17  <=37 Units/L Final   • GPT/ALT 03/19/2021 22  <64 Units/L Final   • Alkaline Phosphatase 03/19/2021 113  45 - 117 Units/L Final   • Albumin 03/19/2021 3.6  3.6 - 5.1 g/dL Final   • Protein, Total 03/19/2021 8.1  6.4 - 8.2 g/dL Final   • Globulin 03/19/2021 4.5* 2.0 - 4.0 g/dL Final   • A/G Ratio 03/19/2021 0.8* 1.0 - 2.4 Final       ASSESSMENT/PLAN     ASSESSMENT:  1. Annual physical exam    2. Essential hypertension with goal blood pressure less than 140/90    3. Combined hyperlipidemia    4. Stage 3a chronic kidney disease (CMS/HCC)    5. Encounter for colorectal cancer screening    6. Visit for screening mammogram        PLAN:  Orders Placed This Encounter   • Mammo Screening Bilateral   • Comprehensive Metabolic Panel   • Lipid Panel With Reflex   • Microalbumin Urine Random   • SERVICE TO SURGERY GENERAL     In addition to being tobacco free and maintaining a healthy weight, I have recommended 30 minutes of cardiovascular exercise 3-5 times a week and attention to a balanced diet with controlled portion sizes.    Reviewed the health  risks associated with continued smoking and urged the patient to quit.  Unfortunately, her readiness to quit at this time is in the pre-contemplation stage.  I have offered assistance with smoking cessation when she is ready to move forward to the contemplation or preparation stage.    Blood Pressure Management:     BP Readings from Last 3 Encounters:   03/22/21 126/80   02/17/21 124/80   01/15/20 137/73     Overall course of hypertension is unchanged and well controlled.  Goal of treatment is <140/90.  No change to the present treatment plan.   Continue on current medication and lifestyle modifications.      Cholesterol Management:     Cholesterol (mg/dL)   Date Value   03/19/2021 176     HDL (mg/dL)   Date Value   03/19/2021 42 (L)     Overall course of cholesterol management is unchanged and controlled.  Present treatment plan reviewed with patient and no changes made at this time.    Reviewed with her lifestyle modifications to improve her cholesterol and reduce her risk for atherosclerotic cardiovascular disease.     CKD is stable.  Present treatment plan reviewed with patient and no changes made at this time.       Return in about 6 months (around 9/22/2021) for HTN, CKD, with FASTING bloodwork prior to appointment.    She will call or rfgrtturn to the office for any persistent, worsening, or concerning symptoms.  For any emergencies, she will present to the emergency room or call 911.    The patient understands, agrees, and will comply with today's plan.

## 2024-09-25 NOTE — TELEPHONE ENCOUNTER
Requested Prescriptions     Signed Prescriptions Disp Refills    losartan (COZAAR) 25 MG Tab 90 Tablet 3     Sig: Take 1 Tablet by mouth every day.     Authorizing Provider: TORIBIO MARKS A.P.R.N.

## 2024-10-01 ENCOUNTER — APPOINTMENT (OUTPATIENT)
Dept: MEDICAL GROUP | Facility: PHYSICIAN GROUP | Age: 59
End: 2024-10-01
Payer: COMMERCIAL

## 2024-10-01 VITALS
OXYGEN SATURATION: 99 % | SYSTOLIC BLOOD PRESSURE: 116 MMHG | TEMPERATURE: 97 F | DIASTOLIC BLOOD PRESSURE: 68 MMHG | HEART RATE: 61 BPM | BODY MASS INDEX: 29.4 KG/M2 | WEIGHT: 194 LBS | HEIGHT: 68 IN

## 2024-10-01 DIAGNOSIS — Z23 NEED FOR VACCINATION: ICD-10-CM

## 2024-10-01 DIAGNOSIS — L29.89 CHRONIC PRURITIC RASH IN ADULT: ICD-10-CM

## 2024-10-01 DIAGNOSIS — Z00.00 PREVENTATIVE HEALTH CARE: ICD-10-CM

## 2024-10-01 PROCEDURE — 3074F SYST BP LT 130 MM HG: CPT | Performed by: NURSE PRACTITIONER

## 2024-10-01 PROCEDURE — 90471 IMMUNIZATION ADMIN: CPT | Performed by: NURSE PRACTITIONER

## 2024-10-01 PROCEDURE — 3078F DIAST BP <80 MM HG: CPT | Performed by: NURSE PRACTITIONER

## 2024-10-01 PROCEDURE — 90656 IIV3 VACC NO PRSV 0.5 ML IM: CPT | Performed by: NURSE PRACTITIONER

## 2024-10-01 PROCEDURE — 99214 OFFICE O/P EST MOD 30 MIN: CPT | Mod: 25 | Performed by: NURSE PRACTITIONER

## 2024-10-01 RX ORDER — TRIAMCINOLONE ACETONIDE 1 MG/G
1 OINTMENT TOPICAL 2 TIMES DAILY
Qty: 80 G | Refills: 0 | Status: SHIPPED | OUTPATIENT
Start: 2024-10-01 | End: 2024-10-15

## 2024-10-01 ASSESSMENT — FIBROSIS 4 INDEX: FIB4 SCORE: 1.09

## 2024-10-28 ENCOUNTER — APPOINTMENT (RX ONLY)
Dept: URBAN - METROPOLITAN AREA CLINIC 4 | Facility: CLINIC | Age: 59
Setting detail: DERMATOLOGY
End: 2024-10-28

## 2024-10-28 DIAGNOSIS — L57.0 ACTINIC KERATOSIS: ICD-10-CM

## 2024-10-28 DIAGNOSIS — Z71.89 OTHER SPECIFIED COUNSELING: ICD-10-CM

## 2024-10-28 PROCEDURE — 99203 OFFICE O/P NEW LOW 30 MIN: CPT

## 2024-10-28 PROCEDURE — ? SUNSCREEN RECOMMENDATIONS

## 2024-10-28 PROCEDURE — ? COUNSELING

## 2024-10-28 ASSESSMENT — LOCATION ZONE DERM: LOCATION ZONE: FACE

## 2024-10-28 ASSESSMENT — LOCATION DETAILED DESCRIPTION DERM
LOCATION DETAILED: RIGHT SUPERIOR LATERAL MALAR CHEEK
LOCATION DETAILED: RIGHT FOREHEAD
LOCATION DETAILED: LEFT SUPERIOR LATERAL MALAR CHEEK

## 2024-10-28 ASSESSMENT — LOCATION SIMPLE DESCRIPTION DERM
LOCATION SIMPLE: LEFT CHEEK
LOCATION SIMPLE: RIGHT FOREHEAD
LOCATION SIMPLE: RIGHT CHEEK

## 2024-10-29 ENCOUNTER — RX ONLY (OUTPATIENT)
Age: 59
Setting detail: RX ONLY
End: 2024-10-29

## 2024-10-29 RX ORDER — FLUOROURACIL 2 G/40G
CREAM TOPICAL
Qty: 40 | Refills: 0 | Status: ERX | COMMUNITY
Start: 2024-10-29

## 2024-12-06 ENCOUNTER — PHARMACY VISIT (OUTPATIENT)
Dept: PHARMACY | Facility: MEDICAL CENTER | Age: 59
End: 2024-12-06
Payer: COMMERCIAL

## 2024-12-06 ENCOUNTER — APPOINTMENT (OUTPATIENT)
Dept: RADIOLOGY | Facility: MEDICAL CENTER | Age: 59
End: 2024-12-06
Attending: EMERGENCY MEDICINE
Payer: COMMERCIAL

## 2024-12-06 ENCOUNTER — HOSPITAL ENCOUNTER (EMERGENCY)
Facility: MEDICAL CENTER | Age: 59
End: 2024-12-06
Attending: EMERGENCY MEDICINE
Payer: COMMERCIAL

## 2024-12-06 VITALS
OXYGEN SATURATION: 99 % | WEIGHT: 190 LBS | DIASTOLIC BLOOD PRESSURE: 86 MMHG | SYSTOLIC BLOOD PRESSURE: 143 MMHG | RESPIRATION RATE: 16 BRPM | BODY MASS INDEX: 28.8 KG/M2 | TEMPERATURE: 97.1 F | HEART RATE: 69 BPM

## 2024-12-06 DIAGNOSIS — S43.004A DISLOCATION OF RIGHT SHOULDER JOINT, INITIAL ENCOUNTER: ICD-10-CM

## 2024-12-06 PROCEDURE — 700111 HCHG RX REV CODE 636 W/ 250 OVERRIDE (IP): Performed by: EMERGENCY MEDICINE

## 2024-12-06 PROCEDURE — 99285 EMERGENCY DEPT VISIT HI MDM: CPT

## 2024-12-06 PROCEDURE — 23650 CLTX SHO DSLC W/MNPJ WO ANES: CPT

## 2024-12-06 PROCEDURE — 94799 UNLISTED PULMONARY SVC/PX: CPT

## 2024-12-06 PROCEDURE — 73030 X-RAY EXAM OF SHOULDER: CPT | Mod: RT

## 2024-12-06 PROCEDURE — 96375 TX/PRO/DX INJ NEW DRUG ADDON: CPT

## 2024-12-06 PROCEDURE — 73200 CT UPPER EXTREMITY W/O DYE: CPT | Mod: RT

## 2024-12-06 PROCEDURE — 96376 TX/PRO/DX INJ SAME DRUG ADON: CPT

## 2024-12-06 PROCEDURE — 96374 THER/PROPH/DIAG INJ IV PUSH: CPT

## 2024-12-06 PROCEDURE — RXMED WILLOW AMBULATORY MEDICATION CHARGE: Performed by: EMERGENCY MEDICINE

## 2024-12-06 RX ORDER — HYDROMORPHONE HYDROCHLORIDE 1 MG/ML
1 INJECTION, SOLUTION INTRAMUSCULAR; INTRAVENOUS; SUBCUTANEOUS ONCE
Status: COMPLETED | OUTPATIENT
Start: 2024-12-06 | End: 2024-12-06

## 2024-12-06 RX ORDER — PROPOFOL 10 MG/ML
1 INJECTION, EMULSION INTRAVENOUS ONCE
Status: COMPLETED | OUTPATIENT
Start: 2024-12-06 | End: 2024-12-06

## 2024-12-06 RX ORDER — MORPHINE SULFATE 15 MG/1
15 TABLET ORAL EVERY 6 HOURS PRN
Qty: 10 TABLET | Refills: 0 | Status: SHIPPED | OUTPATIENT
Start: 2024-12-06 | End: 2024-12-11

## 2024-12-06 RX ORDER — PROPOFOL 10 MG/ML
200 INJECTION, EMULSION INTRAVENOUS ONCE
Status: COMPLETED | OUTPATIENT
Start: 2024-12-06 | End: 2024-12-06

## 2024-12-06 RX ADMIN — PROPOFOL 80 MG: 10 INJECTION, EMULSION INTRAVENOUS at 15:11

## 2024-12-06 RX ADMIN — HYDROMORPHONE HYDROCHLORIDE 1 MG: 1 INJECTION, SOLUTION INTRAMUSCULAR; INTRAVENOUS; SUBCUTANEOUS at 13:34

## 2024-12-06 RX ADMIN — PROPOFOL 80 MG: 10 INJECTION, EMULSION INTRAVENOUS at 14:26

## 2024-12-06 RX ADMIN — HYDROMORPHONE HYDROCHLORIDE 1 MG: 1 INJECTION, SOLUTION INTRAMUSCULAR; INTRAVENOUS; SUBCUTANEOUS at 12:57

## 2024-12-06 ASSESSMENT — FIBROSIS 4 INDEX: FIB4 SCORE: 1.09

## 2024-12-06 ASSESSMENT — PAIN DESCRIPTION - PAIN TYPE
TYPE: ACUTE PAIN
TYPE: ACUTE PAIN

## 2024-12-06 NOTE — ED TRIAGE NOTES
Chief Complaint   Patient presents with    Shoulder Injury     Pt states he fell from 8ft ladder and injured his R shoulder.  Cms intact distally

## 2024-12-06 NOTE — ED PROVIDER NOTES
ER Provider Note    Scribed for Jadiel Mcdaniel Ii, M.d. by Fortino Porter. 12/6/2024  12:48 PM    Primary Care Provider: FERNY Salazar    CHIEF COMPLAINT   Chief Complaint   Patient presents with    Shoulder Injury     Pt states he fell from 8ft ladder and injured his R shoulder.  Cms intact distally     EXTERNAL RECORDS REVIEWED  Other None pertinent.     HPI/ROS  LIMITATION TO HISTORY   Select: : None  OUTSIDE HISTORIAN(S):  None    Ki Melo is a 59 y.o. male who presents to the ED for evaluation of a right shoulder injury due to a fall off an 8 ft ladder. The patient reports associated symptoms of decreased mobility of the right arm secondary to pain. Pain is exacerbated with movement. He denies any medical history. The patient last ate around 6:30 AM.     PAST MEDICAL HISTORY  Past Medical History:   Diagnosis Date    Heart burn     High cholesterol     Insomnia 7/6/2018     SURGICAL HISTORY  Past Surgical History:   Procedure Laterality Date    INGUINAL HERNIA REPAIR BILATERAL Bilateral 9/20/2017    Procedure: INGUINAL HERNIA REPAIR BILATERAL WITH MESH  ;  Surgeon: Ed Ro M.D.;  Location: SURGERY [facility];  Service: General    OTHER ORTHOPEDIC SURGERY  2009    ORIF left wrist    SEPTOTURBINOPLASTY  6/4/08    Performed by ED TIRADO at SURGERY SAME DAY [facility]    OTHER ORTHOPEDIC SURGERY  2007    right knee scope     FAMILY HISTORY  Family History   Problem Relation Age of Onset    No Known Problems Mother     Other Father         smoker     Lung Disease Father     No Known Problems Brother     Hypertension Brother      SOCIAL HISTORY   reports that he has never smoked. He has never used smokeless tobacco. He reports current alcohol use. He reports current drug use. Frequency: 2.00 times per week. Drugs: Marijuana and Inhaled.    CURRENT MEDICATIONS  Previous Medications    ATORVASTATIN (LIPITOR) 10 MG TAB    Take 1 Tablet by mouth every evening.    LOSARTAN  (COZAAR) 25 MG TAB    Take 1 Tablet by mouth every day.    MULTIPLE VITAMINS-MINERALS (MENS MULTIPLUS PO)    Take  by mouth.     ALLERGIES  Patient has no known allergies.    PHYSICAL EXAM  BP (!) 185/106   Pulse 65   Temp 36.2 °C (97.1 °F) (Temporal)   Resp 16   Wt 86.2 kg (190 lb)   SpO2 100%   BMI 28.80 kg/m²   Physical Exam  Vitals and nursing note reviewed.   Constitutional:       Comments: Middle aged man in pain   Cardiovascular:      Rate and Rhythm: Normal rate and regular rhythm.   Pulmonary:      Effort: Pulmonary effort is normal.   Musculoskeletal:      Comments: Right arm partially flexed at elbow, tenderness at shoulder and difficulty to tell if deformity. Normal distal pulse, sensation and strength in hand.    Neurological:      Mental Status: He is alert.     DIAGNOSTIC STUDIES    RADIOLOGY/PROCEDURES   The attending emergency physician has independently interpreted the diagnostic imaging associated with this visit and am waiting the final reading from the radiologist.   My preliminary interpretation is a follows: right anterior shoulder dislocation with greater tuberosity fracture.  Reviewed CT scan and was able to look for any other injuries such as rib fractures or pneumothorax and there was none.  There is mentioning of trace gas at the glenohumeral joint.  There is no external wounds to suggest a traumatic arthrotomy.    Radiologist interpretation:  CT-SHOULDER W/O PLUS RECONS RIGHT   Final Result      1.  Reduction of the previously demonstrated anterior shoulder dislocation.   2.  Comminuted fracture of the right humeral greater tuberosity.   3.  Adjacent soft tissue swelling with stranding along the axillary vessels. There are intramuscular hematomas about the shoulder joint.   4.  Trace gas within the glenohumeral joint likely related to vacuum phenomenon. Correlate for evidence of traumatic arthrotomy.      DX-SHOULDER 2+ RIGHT   Final Result      1. Status post reduction of right  shoulder fracture dislocation.   2. Right shoulder soft tissue swelling.      DX-SHOULDER 2+ RIGHT   Final Result      Unsuccessful reduction of fracture dislocation of the right shoulder.      DX-SHOULDER 2+ RIGHT   Final Result      Anterior shoulder dislocation with comminuted displaced fracturing of the greater tuberosity.      Conscious Sedation Procedure Note    Indication: shoulder dislocation    Consent: I have discussed with the patient and/or the patient representative the indication, alternatives, and the possible risks and/or complications of the planned procedure and the anesthesia methods. The patient and/or patient representative appear to understand and agree to proceed.    Physician Involvement: The attending physician was present and supervising this procedure.    Pre-Sedation Documentation and Exam: Vital signs have been reviewed (see flow sheet for vitals).  Airway Assessment: normal, Mallampati Class I - (soft palate, fauces, uvula & anterior/posterior tonsillar pillars are visible)    Prior History of Anesthesia Complications: none    ASA Classification: Class 2 - A normal healthy patient with mild systemic disease    Sedation/ Anesthesia Plan: intravenous sedation    Medications Used: propofol 160 mg intravenously (80 mg IV each procedure)     Total Time - 30 minutes     Monitoring and Safety: The patient was placed on a cardiac monitor and vital signs, pulse oximetry and level of consciousness were continuously evaluated throughout the procedure. The patient was closely monitored until recovery from the medications was complete and the patient had returned to baseline status. Respiratory therapy was on standby at all times during the procedure.    (The following sections must be completed)  Post-Sedation Vital Signs: Vital signs were reviewed and were stable after the procedure (see flow sheet for vitals)            Post-Sedation Exam: Lungs: clear to auscultation bilaterally without crackles  or wheezing and Cardiovascular: normal, regular rate and rhythm    Complications: There was no complication with the procedure but we did have to sedate twice.  The total time of sedation was approximately 30 minutes.  We had to sedate a second time because the repeat x-ray of the shoulder after initial sedation showed that the shoulder was still dislocated.      Joint Reduction Procedure Note    Indication: Joint dislocation and fracture    Consent: The patient was counseled regarding the procedure, it's indications, risks, potential complications and alternatives and any questions were answered. Consent was obtained.    Procedure: The pre-reduction exam showed distal perfusion & neurologic function to be normal. The patient was placed in the appropriate position. Reduction of the right shoulder was performed by traction and counter traction.  There was a palpable clunk into the joint I was able to fully range the arm however post reduction films were obtained and revealed continued dislocation.  I think is possible he redislocated.  A post-reduction exam revealed distal perfusion & neurologic function to be normal.     He was then sedated again.  Using traction countertraction, again felt a clunk and was able to fully range the shoulder.  X-ray was present at bedside and x-ray showed relocation of the shoulder.    The patient tolerated the procedure well.    Complications: Had to resedate and rereduce the shoulder.      ---------------------------------------------------------------        COURSE & MEDICAL DECISION MAKING     ASSESSMENT, COURSE AND PLAN  Care Narrative: This is an emergency department evaluation of a 59 year old man who fell off a ladder onto his right shoulder. He is in significant pain. Possible proximal humerus fracture vs dislocation vs AC separation. Ordered Dilaudid 1 mg IV.     1:16 PM - Reviewed XR right shoulder significant for fracture and dislocation.  Anterior right shoulder  dislocation with greater tuberosity fracture.  Paged orthopedics.     1:22 PM - Patient was reevaluated at bedside. Discussed radiology results with the patient and informed them of findings significant for fracture and dislocation.  Discussed attempting quick reduction with pain medication here, but he declined. Awaiting return call from orthopedics. Additional Dilaudid 1 mg injection ordered for pain.     1:32 PM - The patient was reevaluated at bedside. Discussed joint reduction with procedural sedation. The patient denies any previous complications with sedation.     2:10 PM - I discussed the patient's case and the above findings with Dr. Medina (Orthopedics) who agreed with my plan for reduction here. He recommended follow-up in clinic on Wednesday and to obtain CT shoulder before discharge.     2:17 PM - Patient was reevaluated at bedside. Informed the patient of my conversation with orthopedics.  Performed joint reduction at this time.     2:51 PM - Reviewed films which showed continuous dislocation.  I suspect that the shoulder redislocated.  Informed the patient of my plan for repeat conscious sedation and joint reduction procedures.     3:09 PM - Conscious sedation and joint reduction performed again at this time. Reviewed post procedural X-ray imaging which shows satisfactory reduction.     5:30 PM - The patient was revaluated at bedside. I informed the patient of CT results. Discussed contacting [facility] to setup follow-up for Wednesday. The patient will be given a prescription for morphine for pain. Patient verbalizes understanding and agreement to this plan of care.      PROBLEM LIST  # Right anterior shoulder dislocation with greater tuberosity fracture    DISPOSITION AND DISCUSSIONS  I have discussed management of the patient with the following physicians and PRIETO's:  Dr. Medina (Orthopedics)     Discussion of management with other Q or appropriate source(s): RT plans for moderate sedation         Barriers to care at this time, including but not limited to:  None known .     Decision tools and prescription drugs considered including, but not limited to: Pain Medications Morphine 15 mg Tab .    I reviewed prescription monitoring program for patient's narcotic use before prescribing a scheduled drug.The patient will not drink alcohol nor drive with prescribed medications. The patient will return for new or worsening symptoms and is stable at the time of discharge.    The patient is referred to a primary physician for blood pressure management, diabetic screening, and for all other preventative health concerns.    In prescribing controlled substances to this patient, I certify that I have obtained and reviewed the medical history of Ki Melo. I have also made a good dany effort to obtain applicable records from other providers who have treated the patient and records did not demonstrate any increased risk of substance abuse that would prevent me from prescribing controlled substances.     I have conducted a physical exam and documented it. I have reviewed Mr. Melo’s prescription history as maintained by the [place] Prescription Monitoring Program.     I have assessed the patient’s risk for abuse, dependency, and addiction using the validated Opioid Risk Tool available at https://www.mdcalc.com/wxigmq-cbtg-lbpw-ort-narcotic-abuse.     Given the above, I believe the benefits of controlled substance therapy outweigh the risks. The reasons for prescribing controlled substances include non-narcotic, oral analgesic alternatives have been inadequate for pain control. Accordingly, I have discussed the risk and benefits, treatment plan, and alternative therapies with the patient.     DISPOSITION:  Patient will be discharged home in stable condition.    FOLLOW UP:  Herb Medina M.D.  [address] [facility] [id number] [phone]    Call   Call Monday and you should be making an  appointment to see Dr. Medina on Wednesday    OUTPATIENT MEDICATIONS:  Discharge Medication List as of 12/6/2024  5:30 PM        START taking these medications    Details   morphine (MS IR) 15 MG tablet Take 1 Tablet by mouth every 6 hours as needed for Severe Pain for up to 5 days., Disp-10 Tablet, R-0, Normal            FINAL DIAGNOSIS  1. Dislocation of right shoulder joint, initial encounter    +Conscious Sedation  +Joint Reduction      Fortino HUNG (Scribe), am scribing for, and in the presence of, ALLIE Gunter II.    Electronically signed by: Fortino Porter (Scribe), 12/6/2024    Jadiel HUNG II, M* personally performed the services described in this documentation, as scribed by Fortino Porter in my presence, and it is both accurate and complete.      The note accurately reflects work and decisions made by me.  Jadiel Mcdaniel II, M.D.  12/6/2024  8:49 PM

## 2024-12-06 NOTE — ED NOTES
Pt transported to [place] [address] via wheel chair. Friend at bedside. Pt placed in gown and placed on continuous monitoring. Call light within reach. Pt refuses to put sling back on at this moment due to pain. PIV placed. Pt's chart up for ERP evaluation.

## 2024-12-06 NOTE — ED NOTES
Bedside report received by RN Gretchen    Oxygen RA  Fall Risk status: bed in lowest position/locked, call light within reach  Patient Mentation:A+O x 4  Pending XR and splint placement

## 2024-12-06 NOTE — ED NOTES
R shoulder reduction    1508-  timeout called by ERP. ERP at bedside, RT at bedside, ED tech and this RN. Consent signed and verified. Pt and every one in the room agree with procedure. VS q3 min. Pt on 4L of oxygen for procedure with ETCO2.  1511-80 mg of propofol given  1513- R shoulder reduced. Order placed for xray. Xray called  1515-xray at bedside  1519- pt titrated down to 2L of oxygen. Pt moving all extremities. Pt alert and oriented  1523- pt on RA. ED tech to place R shoulder immobilizer    See flowsheets for VS

## 2024-12-06 NOTE — ED NOTES
R shoulder reduction sedation note:    1422- timeout called by ERP. ERP at bedside, RT at bedside, ED tech and this RN. Consent signed and verified. Pt and every one in the room agree with procedure. VS q3 min. Pt on 4L of oxygen for procedure  1426-40mg of propofol given  1427- pt still alert  1428- 40mg of prop given  1429- pts R shoulder reduced by ERP  1430- sling placed to R arm  1433- xray called  1436- pt titrated to 2L of oxygen  1438- pt off of oxygen, moving all extremities. Xray at bedside      See flowsheets for VS

## 2024-12-07 NOTE — ED NOTES
Pt understands DC instructions and follow-up, DC paperwork provided, IV removed ,pt ambulated with steady gait to [place] pharmacy for prescription pick-up

## 2024-12-09 ENCOUNTER — APPOINTMENT (OUTPATIENT)
Dept: MEDICAL GROUP | Facility: PHYSICIAN GROUP | Age: 59
End: 2024-12-09
Payer: COMMERCIAL

## 2025-02-08 ENCOUNTER — HOSPITAL ENCOUNTER (OUTPATIENT)
Dept: LAB | Facility: MEDICAL CENTER | Age: 60
End: 2025-02-08
Attending: NURSE PRACTITIONER
Payer: COMMERCIAL

## 2025-02-08 DIAGNOSIS — Z00.00 PREVENTATIVE HEALTH CARE: ICD-10-CM

## 2025-02-08 LAB
ALBUMIN SERPL BCP-MCNC: 4.5 G/DL (ref 3.2–4.9)
ALBUMIN/GLOB SERPL: 1.6 G/DL
ALP SERPL-CCNC: 63 U/L (ref 30–99)
ALT SERPL-CCNC: 36 U/L (ref 2–50)
ANION GAP SERPL CALC-SCNC: 7 MMOL/L (ref 7–16)
AST SERPL-CCNC: 28 U/L (ref 12–45)
BILIRUB SERPL-MCNC: 0.5 MG/DL (ref 0.1–1.5)
BUN SERPL-MCNC: 17 MG/DL (ref 8–22)
CALCIUM ALBUM COR SERPL-MCNC: 9.3 MG/DL (ref 8.5–10.5)
CALCIUM SERPL-MCNC: 9.7 MG/DL (ref 8.5–10.5)
CHLORIDE SERPL-SCNC: 105 MMOL/L (ref 96–112)
CHOLEST SERPL-MCNC: 209 MG/DL (ref 100–199)
CO2 SERPL-SCNC: 25 MMOL/L (ref 20–33)
CREAT SERPL-MCNC: 1.06 MG/DL (ref 0.5–1.4)
ERYTHROCYTE [DISTWIDTH] IN BLOOD BY AUTOMATED COUNT: 40.4 FL (ref 35.9–50)
GFR SERPLBLD CREATININE-BSD FMLA CKD-EPI: 81 ML/MIN/1.73 M 2
GLOBULIN SER CALC-MCNC: 2.9 G/DL (ref 1.9–3.5)
GLUCOSE SERPL-MCNC: 103 MG/DL (ref 65–99)
HCT VFR BLD AUTO: 45.9 % (ref 42–52)
HDLC SERPL-MCNC: 56 MG/DL
HGB BLD-MCNC: 15.8 G/DL (ref 14–18)
LDLC SERPL CALC-MCNC: 129 MG/DL
MCH RBC QN AUTO: 30.7 PG (ref 27–33)
MCHC RBC AUTO-ENTMCNC: 34.4 G/DL (ref 32.3–36.5)
MCV RBC AUTO: 89.1 FL (ref 81.4–97.8)
PLATELET # BLD AUTO: 223 K/UL (ref 164–446)
PMV BLD AUTO: 10.9 FL (ref 9–12.9)
POTASSIUM SERPL-SCNC: 4.4 MMOL/L (ref 3.6–5.5)
PROT SERPL-MCNC: 7.4 G/DL (ref 6–8.2)
RBC # BLD AUTO: 5.15 M/UL (ref 4.7–6.1)
SODIUM SERPL-SCNC: 137 MMOL/L (ref 135–145)
TRIGL SERPL-MCNC: 118 MG/DL (ref 0–149)
WBC # BLD AUTO: 5.9 K/UL (ref 4.8–10.8)

## 2025-02-08 PROCEDURE — 85027 COMPLETE CBC AUTOMATED: CPT

## 2025-02-08 PROCEDURE — 80061 LIPID PANEL: CPT

## 2025-02-08 PROCEDURE — 36415 COLL VENOUS BLD VENIPUNCTURE: CPT

## 2025-02-08 PROCEDURE — 80053 COMPREHEN METABOLIC PANEL: CPT

## 2025-02-23 SDOH — ECONOMIC STABILITY: FOOD INSECURITY: WITHIN THE PAST 12 MONTHS, THE FOOD YOU BOUGHT JUST DIDN'T LAST AND YOU DIDN'T HAVE MONEY TO GET MORE.: NEVER TRUE

## 2025-02-23 SDOH — HEALTH STABILITY: PHYSICAL HEALTH: ON AVERAGE, HOW MANY DAYS PER WEEK DO YOU ENGAGE IN MODERATE TO STRENUOUS EXERCISE (LIKE A BRISK WALK)?: 2 DAYS

## 2025-02-23 SDOH — ECONOMIC STABILITY: INCOME INSECURITY: IN THE LAST 12 MONTHS, WAS THERE A TIME WHEN YOU WERE NOT ABLE TO PAY THE MORTGAGE OR RENT ON TIME?: NO

## 2025-02-23 SDOH — HEALTH STABILITY: PHYSICAL HEALTH: ON AVERAGE, HOW MANY MINUTES DO YOU ENGAGE IN EXERCISE AT THIS LEVEL?: 60 MIN

## 2025-02-23 SDOH — ECONOMIC STABILITY: INCOME INSECURITY: HOW HARD IS IT FOR YOU TO PAY FOR THE VERY BASICS LIKE FOOD, HOUSING, MEDICAL CARE, AND HEATING?: NOT VERY HARD

## 2025-02-23 SDOH — ECONOMIC STABILITY: FOOD INSECURITY: WITHIN THE PAST 12 MONTHS, YOU WORRIED THAT YOUR FOOD WOULD RUN OUT BEFORE YOU GOT MONEY TO BUY MORE.: NEVER TRUE

## 2025-02-23 ASSESSMENT — SOCIAL DETERMINANTS OF HEALTH (SDOH)
HOW OFTEN DO YOU ATTENT MEETINGS OF THE CLUB OR ORGANIZATION YOU BELONG TO?: NEVER
HOW OFTEN DO YOU HAVE SIX OR MORE DRINKS ON ONE OCCASION: NEVER
WITHIN THE PAST 12 MONTHS, YOU WORRIED THAT YOUR FOOD WOULD RUN OUT BEFORE YOU GOT THE MONEY TO BUY MORE: NEVER TRUE
HOW OFTEN DO YOU ATTENT MEETINGS OF THE CLUB OR ORGANIZATION YOU BELONG TO?: NEVER
HOW OFTEN DO YOU GET TOGETHER WITH FRIENDS OR RELATIVES?: MORE THAN THREE TIMES A WEEK
HOW OFTEN DO YOU ATTEND CHURCH OR RELIGIOUS SERVICES?: NEVER
HOW MANY DRINKS CONTAINING ALCOHOL DO YOU HAVE ON A TYPICAL DAY WHEN YOU ARE DRINKING: 3 OR 4
IN A TYPICAL WEEK, HOW MANY TIMES DO YOU TALK ON THE PHONE WITH FAMILY, FRIENDS, OR NEIGHBORS?: MORE THAN THREE TIMES A WEEK
HOW OFTEN DO YOU HAVE A DRINK CONTAINING ALCOHOL: 2-3 TIMES A WEEK
HOW OFTEN DO YOU ATTEND CHURCH OR RELIGIOUS SERVICES?: NEVER
IN THE PAST 12 MONTHS, HAS THE ELECTRIC, GAS, OIL, OR WATER COMPANY THREATENED TO SHUT OFF SERVICE IN YOUR HOME?: NO
DO YOU BELONG TO ANY CLUBS OR ORGANIZATIONS SUCH AS CHURCH GROUPS UNIONS, FRATERNAL OR ATHLETIC GROUPS, OR SCHOOL GROUPS?: NO
IN A TYPICAL WEEK, HOW MANY TIMES DO YOU TALK ON THE PHONE WITH FAMILY, FRIENDS, OR NEIGHBORS?: MORE THAN THREE TIMES A WEEK
HOW HARD IS IT FOR YOU TO PAY FOR THE VERY BASICS LIKE FOOD, HOUSING, MEDICAL CARE, AND HEATING?: NOT VERY HARD
DO YOU BELONG TO ANY CLUBS OR ORGANIZATIONS SUCH AS CHURCH GROUPS UNIONS, FRATERNAL OR ATHLETIC GROUPS, OR SCHOOL GROUPS?: NO
HOW OFTEN DO YOU GET TOGETHER WITH FRIENDS OR RELATIVES?: MORE THAN THREE TIMES A WEEK

## 2025-02-23 ASSESSMENT — LIFESTYLE VARIABLES
HOW MANY STANDARD DRINKS CONTAINING ALCOHOL DO YOU HAVE ON A TYPICAL DAY: 3 OR 4
HOW OFTEN DO YOU HAVE A DRINK CONTAINING ALCOHOL: 2-3 TIMES A WEEK
AUDIT-C TOTAL SCORE: 4
HOW OFTEN DO YOU HAVE SIX OR MORE DRINKS ON ONE OCCASION: NEVER
SKIP TO QUESTIONS 9-10: 0

## 2025-02-24 ENCOUNTER — OFFICE VISIT (OUTPATIENT)
Dept: MEDICAL GROUP | Facility: PHYSICIAN GROUP | Age: 60
End: 2025-02-24
Payer: COMMERCIAL

## 2025-02-24 ENCOUNTER — APPOINTMENT (OUTPATIENT)
Dept: URBAN - METROPOLITAN AREA CLINIC 4 | Facility: CLINIC | Age: 60
Setting detail: DERMATOLOGY
End: 2025-02-24

## 2025-02-24 VITALS
DIASTOLIC BLOOD PRESSURE: 70 MMHG | BODY MASS INDEX: 29.7 KG/M2 | SYSTOLIC BLOOD PRESSURE: 110 MMHG | HEIGHT: 68 IN | WEIGHT: 196 LBS | HEART RATE: 69 BPM | OXYGEN SATURATION: 98 % | TEMPERATURE: 98.1 F

## 2025-02-24 DIAGNOSIS — E78.5 DYSLIPIDEMIA: ICD-10-CM

## 2025-02-24 DIAGNOSIS — Z12.11 SCREENING FOR COLON CANCER: ICD-10-CM

## 2025-02-24 DIAGNOSIS — D22 MELANOCYTIC NEVI: ICD-10-CM

## 2025-02-24 DIAGNOSIS — L57.0 ACTINIC KERATOSIS: ICD-10-CM

## 2025-02-24 DIAGNOSIS — Z09 ENCOUNTER FOR FOLLOW-UP EXAMINATION AFTER COMPLETED TREATMENT FOR CONDITIONS OTHER THAN MALIGNANT NEOPLASM: ICD-10-CM

## 2025-02-24 DIAGNOSIS — Z71.89 OTHER SPECIFIED COUNSELING: ICD-10-CM

## 2025-02-24 DIAGNOSIS — Z00.00 ANNUAL VISIT FOR GENERAL ADULT MEDICAL EXAMINATION WITHOUT ABNORMAL FINDINGS: ICD-10-CM

## 2025-02-24 DIAGNOSIS — Z23 NEED FOR VACCINATION: ICD-10-CM

## 2025-02-24 DIAGNOSIS — I10 PRIMARY HYPERTENSION: ICD-10-CM

## 2025-02-24 DIAGNOSIS — L81.4 OTHER MELANIN HYPERPIGMENTATION: ICD-10-CM

## 2025-02-24 PROBLEM — D22.5 MELANOCYTIC NEVI OF TRUNK: Status: ACTIVE | Noted: 2025-02-24

## 2025-02-24 PROBLEM — D23.5 OTHER BENIGN NEOPLASM OF SKIN OF TRUNK: Status: ACTIVE | Noted: 2025-02-24

## 2025-02-24 PROCEDURE — ? ADDITIONAL NOTES

## 2025-02-24 PROCEDURE — 90677 PCV20 VACCINE IM: CPT | Performed by: NURSE PRACTITIONER

## 2025-02-24 PROCEDURE — 3074F SYST BP LT 130 MM HG: CPT | Performed by: NURSE PRACTITIONER

## 2025-02-24 PROCEDURE — 99213 OFFICE O/P EST LOW 20 MIN: CPT

## 2025-02-24 PROCEDURE — ? COUNSELING

## 2025-02-24 PROCEDURE — 99396 PREV VISIT EST AGE 40-64: CPT | Mod: 25 | Performed by: NURSE PRACTITIONER

## 2025-02-24 PROCEDURE — 99214 OFFICE O/P EST MOD 30 MIN: CPT | Mod: 25 | Performed by: NURSE PRACTITIONER

## 2025-02-24 PROCEDURE — ? SUNSCREEN RECOMMENDATIONS

## 2025-02-24 PROCEDURE — ? MEDICATION COUNSELING

## 2025-02-24 PROCEDURE — 3078F DIAST BP <80 MM HG: CPT | Performed by: NURSE PRACTITIONER

## 2025-02-24 PROCEDURE — 90471 IMMUNIZATION ADMIN: CPT | Performed by: NURSE PRACTITIONER

## 2025-02-24 RX ORDER — CEPHALEXIN 500 MG/1
CAPSULE ORAL
COMMUNITY
End: 2025-02-24

## 2025-02-24 RX ORDER — TRIAMCINOLONE ACETONIDE 1 MG/G
OINTMENT TOPICAL
COMMUNITY
End: 2025-02-24

## 2025-02-24 RX ORDER — LOSARTAN POTASSIUM 25 MG/1
25 TABLET ORAL DAILY
Qty: 90 TABLET | Refills: 3 | Status: SHIPPED | OUTPATIENT
Start: 2025-02-24

## 2025-02-24 ASSESSMENT — PATIENT HEALTH QUESTIONNAIRE - PHQ9: CLINICAL INTERPRETATION OF PHQ2 SCORE: 0

## 2025-02-24 ASSESSMENT — LOCATION DETAILED DESCRIPTION DERM
LOCATION DETAILED: LEFT CENTRAL MALAR CHEEK
LOCATION DETAILED: RIGHT CENTRAL MALAR CHEEK
LOCATION DETAILED: STERNUM
LOCATION DETAILED: RIGHT MEDIAL INFERIOR CHEST
LOCATION DETAILED: SUPERIOR MID FOREHEAD

## 2025-02-24 ASSESSMENT — LOCATION ZONE DERM
LOCATION ZONE: TRUNK
LOCATION ZONE: FACE

## 2025-02-24 ASSESSMENT — FIBROSIS 4 INDEX: FIB4 SCORE: 1.21

## 2025-02-24 ASSESSMENT — LOCATION SIMPLE DESCRIPTION DERM
LOCATION SIMPLE: CHEST
LOCATION SIMPLE: SUPERIOR FOREHEAD
LOCATION SIMPLE: LEFT CHEEK
LOCATION SIMPLE: RIGHT CHEEK

## 2025-02-24 NOTE — PROGRESS NOTES
Subjective:     CC:   Chief Complaint   Patient presents with    Annual Exam    Lab Results       HPI:   Ki Melo is a 59 y.o. male who presents for an annual exam. He is feeling well and has no complaints.    Dyslipidemia  He ran out of atorvastatin after he dumped the medication in the sink. He had difficulty replacing his supply with the pharmacy and did not restart atorvastatin. He has been off medication for the last 3 months. He has mede diet changes. Cut out junk food and fast food. Walking daily 2 miles. Eating more salads, fruit and vegetables. Right shoulder dislocated 2024 and since exercise has been down. The 10-year ASCVD risk score (Oleksandr URIARTE, et al., 2019) is: 6.3%.  He would prefer to work on diet and exercise to see if his cholesterol can improve.  Stop atorvastatin today.  Plan for repeat labs in 6 months around 2025.  If cholesterol increased would recommend resuming atorvastatin.  He verbalized understanding.      Primary hypertension  BP today 110/70.  Continues losartan 25 mg daily.  Home /80 and afternoon similar.  No chest pain, shortness of breath, dizziness or cough.      Health Maintenance  Cholesterol Screenin2025   Diabetes Screenin2025 with fasting glucose of 103   Diet: He has made diet changes over the last 3 months.  Cut out junk food and fast food.  Eating more fruits and vegetables.    Exercise: He was walking 2 miles a day until recent shoulder dislocation.  Plans to resume exercise.    Substance Abuse: Discussed and reviewed   Safe in relationship.   Seat belts safety discussed.  Sun protection used.  Has established eye doctor and dental home.    Cancer screening  Skin cancer screening: dermatology   Colorectal Cancer Screenin2022 with 3-year recall, referral back to GI today   Lung Cancer Screening: Non-smoker   Prostate Cancer Screening/PSA: 3/22/2024 complete with  annual labs    Infectious disease  screening/Immunizations  --Practices safe sex.  --HIV Screening: 3/22/2024   --Hepatitis C Screening: 3/18/2023   --Immunizations:    Influenza: 10/1/2024    Tetanus: 9/8/2018    Shingles: Completed series   Pneumococcal : 2/24/2025   Other immunizations: Encouraged COVID booster at the pharmacy     He  has a past medical history of Heart burn, High cholesterol, and Insomnia (7/6/2018).  He  has a past surgical history that includes septoplasty, nose, with turbinoplasty (6/4/08); other orthopedic surgery (2009); other orthopedic surgery (2007); and inguinal hernia repair bilateral (Bilateral, 9/20/2017).  Family History   Problem Relation Age of Onset    No Known Problems Mother     Other Father         smoker     Lung Disease Father     No Known Problems Brother     Hypertension Brother      Social History     Tobacco Use    Smoking status: Never    Smokeless tobacco: Never   Vaping Use    Vaping status: Never Used   Substance Use Topics    Alcohol use: Yes     Comment: 3 drinks each day on weekend    Drug use: Yes     Frequency: 2.0 times per week     Types: Marijuana, Inhaled     Comment: joints qhs on weekends-decreased overall       Patient Active Problem List    Diagnosis Date Noted    Obesity (BMI 30-39.9) 02/22/2024    Primary hypertension 03/02/2022    Dyslipidemia 07/06/2018    Insomnia 07/06/2018       Current Outpatient Medications   Medication Sig Dispense Refill    losartan (COZAAR) 25 MG Tab Take 1 Tablet by mouth every day. 90 Tablet 3    Multiple Vitamins-Minerals (MENS MULTIPLUS PO) Take  by mouth.       No current facility-administered medications for this visit.    (including changes today)  Allergies: Patient has no known allergies.    Review of Systems   Constitutional: Negative for fever, chills and malaise/fatigue.   HENT: Positive for intermittent congestion.    Eyes: Negative for pain.   Respiratory: Negative for cough and shortness of breath.    Cardiovascular: Negative for leg swelling.  "  Gastrointestinal: Negative for nausea, vomiting, abdominal pain and diarrhea.   Genitourinary: Negative for dysuria and hematuria.   Skin: Negative for rash.   Neurological: Negative for dizziness, focal weakness and headaches.   Endo/Heme/Allergies: Does not bruise/bleed easily.   Psychiatric/Behavioral: Negative for depression.  The patient is not nervous/anxious.      Objective:     Vital signs reviewed  /70 (BP Location: Right arm, Patient Position: Sitting, BP Cuff Size: Adult)   Pulse 69   Temp 36.7 °C (98.1 °F) (Temporal)   Ht 1.727 m (5' 8\")   Wt 88.9 kg (196 lb)   SpO2 98%   BMI 29.80 kg/m²   Body mass index is 29.8 kg/m².  Wt Readings from Last 4 Encounters:   02/24/25 88.9 kg (196 lb)   12/06/24 86.2 kg (190 lb)   10/01/24 88 kg (194 lb)   02/22/24 89.8 kg (198 lb)       Physical Exam:  Constitutional: Well-developed and well-nourished. Not diaphoretic. No distress.   Skin: Skin is warm and dry. No rash noted.  Head: Atraumatic without lesions.  Eyes: Conjunctivae and extraocular motions are normal. Pupils are equal, round, and reactive to light. No scleral icterus.  Prescription glasses in place.  Ears:  External ears unremarkable. Tympanic membranes clear and intact.  Nose: Nares patent. Septum midline. Turbinates without erythema nor edema. No discharge.   Mouth/Throat: Dentition is intact. Tongue normal. Oropharynx is clear and moist. Posterior pharynx without erythema or exudates.  Neck: Supple, trachea midline. Normal range of motion. No thyromegaly present. No lymphadenopathy--cervical or supraclavicular.  Cardiovascular: Regular rate and rhythm, S1 and S2 without murmur, rubs, or gallops.    Lungs: Effort normal. Clear to auscultation throughout. No adventitious sounds.  Abdomen: Soft, non tender, and without distention. Active bowel sounds in all four quadrants. No rebound, guarding, masses or HSM.  : Genitalia: Deferred  Rectal: deferred  Prostate: deferred  Extremities: No " cyanosis, clubbing, erythema, nor edema.   Musculoskeletal: All major joints AROM full in all directions without pain.  Neurological: Alert and oriented x 3.   Psychiatric:  Behavior, mood, and affect are appropriate.      Assessment and Plan:     1. Annual visit for general adult medical examination without abnormal findings  Acute uncomplicated problem.  Annual exam completed today.  Discussed and reviewed lab results from 2/8/2025. Discussion today about general wellness and lifestyle habits:  Engage in regular physical and social activities  Skin care, including sunscreen  Recommended annual eye exams and annual dental exams  Discussed wearing seatbelt when in car at all times    2. Dyslipidemia  Chronic exacerbated problem.  Stop atorvastatin at this time.  He will continue to work on diet and exercise.  Plan for repeat labs in 6 months around August 2025. The 10-year ASCVD risk score (Oleksandr URIARTE, et al., 2019) is: 6.3%. If risk score increases or cholesterol increases consider resuming atorvastatin 10 mg daily dose.  - Lipid Profile; Future    3. Primary hypertension  Chronic stable problem.  Continue losartan 25 mg daily.  - losartan (COZAAR) 25 MG Tab; Take 1 Tablet by mouth every day.  Dispense: 90 Tablet; Refill: 3    4. Screening for colon cancer  Acute uncomplicated problem.  Due for 3-year recall for this year.  Referral back to GI.  - Referral to GI for Colonoscopy    5. Need for vaccination  Acute uncomplicated problem.  He would like his pneumonia vaccine today. I have placed the below orders and discussed them with an approved delegating provider. The MA is performing the below orders under the direction of Dr. Sutherland.  - Pneumococcal Conjugate Vaccine 20-Valent (6 wks+)      HCM: PCV-20 today.  Labs per orders.  Vaccinations per orders.  Counseling about diet, supplements, exercise, skin care and safe sex.    Follow-up: Return in about 1 year (around 2/24/2026) for annual.      Please note that  this dictation was created using voice recognition software. I have made every reasonable attempt to correct obvious errors, but I expect that there are errors of grammar and possibly content that I did not discover before finalizing the note.

## 2025-02-24 NOTE — ASSESSMENT & PLAN NOTE
He ran out of atorvastatin after he dumped the medication in the sink. He had difficulty replacing his supply with the pharmacy and did not restart atorvastatin. He has been off medication for the last 3 months. He has mede diet changes. Cut out junk food and fast food. Walking daily 2 miles. Eating more salads, fruit and vegetables. Right shoulder dislocated 12/2024 and since exercise has been down. The 10-year ASCVD risk score (Oleksandr URIARTE, et al., 2019) is: 6.3%.  He would prefer to work on diet and exercise to see if his cholesterol can improve.  Stop atorvastatin today.  Plan for repeat labs in 6 months around August 2025.  If cholesterol increased would recommend resuming atorvastatin.  He verbalized understanding.

## 2025-02-24 NOTE — PROCEDURE: MEDICATION COUNSELING
Carac Pregnancy And Lactation Text: This medication is Pregnancy Category X and contraindicated in pregnancy and in women who may become pregnant. It is unknown if this medication is excreted in breast milk.
Rinvoq Pregnancy And Lactation Text: Based on animal studies, Rinvoq may cause embryo-fetal harm when administered to pregnant women.  The medication should not be used in pregnancy.  Breastfeeding is not recommended during treatment and for 6 days after the last dose.
Oxempic Pregnancy And Lactation Text: The fetal risk of this medication is unknown and taking while pregnant is not recommended. It is unknown if this medication is present in breast milk.
Humira Counseling:  I discussed with the patient the risks of adalimumab including but not limited to myelosuppression, immunosuppression, autoimmune hepatitis, demyelinating diseases, lymphoma, and serious infections.  The patient understands that monitoring is required including a PPD at baseline and must alert us or the primary physician if symptoms of infection or other concerning signs are noted.
Tetracycline Pregnancy And Lactation Text: This medication is Pregnancy Category D and not consider safe during pregnancy. It is also excreted in breast milk.
Topical Retinoid Pregnancy And Lactation Text: This medication is Pregnancy Category C. It is unknown if this medication is excreted in breast milk.
Oral Minoxidil Pregnancy And Lactation Text: This medication should only be used when clearly needed if you are pregnant, attempting to become pregnant or breast feeding.
Topical Sulfur Applications Pregnancy And Lactation Text: This medication is considered safe during pregnancy and breast feeding secondary to limited systemic absorption.
Terbinafine Counseling: Patient counseling regarding adverse effects of terbinafine including but not limited to headache, diarrhea, rash, upset stomach, liver function test abnormalities, itching, taste/smell disturbance, nausea, abdominal pain, and flatulence.  There is a rare possibility of liver failure that can occur when taking terbinafine.  The patient understands that a baseline LFT and kidney function test may be required. The patient verbalized understanding of the proper use and possible adverse effects of terbinafine.  All of the patient's questions and concerns were addressed.
Colchicine Pregnancy And Lactation Text: This medication is Pregnancy Category C and isn't considered safe during pregnancy. It is excreted in breast milk.
Tazorac Counseling:  Patient advised that medication is irritating and drying.  Patient may need to apply sparingly and wash off after an hour before eventually leaving it on overnight.  The patient verbalized understanding of the proper use and possible adverse effects of tazorac.  All of the patient's questions and concerns were addressed.
Xeljanz Counseling: I discussed with the patient the risks of Xeljanz therapy including increased risk of infection, liver issues, headache, diarrhea, or cold symptoms. Live vaccines should be avoided. They were instructed to call if they have any problems.
Minoxidil Pregnancy And Lactation Text: This medication has not been assigned a Pregnancy Risk Category but animal studies failed to show danger with the topical medication. It is unknown if the medication is excreted in breast milk.
Prednisone Pregnancy And Lactation Text: This medication is Pregnancy Category C and it isn't know if it is safe during pregnancy. This medication is excreted in breast milk.
Otezla Counseling: The side effects of Otezla were discussed with the patient, including but not limited to worsening or new depression, weight loss, diarrhea, nausea, upper respiratory tract infection, and headache. Patient instructed to call the office should any adverse effect occur.  The patient verbalized understanding of the proper use and possible adverse effects of Otezla.  All the patient's questions and concerns were addressed.
Humira Pregnancy And Lactation Text: This medication is Pregnancy Category B and is considered safe during pregnancy. It is unknown if this medication is excreted in breast milk.
Wartpeel Counseling:  I discussed with the patient the risks of Wartpeel including but not limited to erythema, scaling, itching, weeping, crusting, and pain.
Mirvaso Counseling: Mirvaso is a topical medication which can decrease superficial blood flow where applied. Side effects are uncommon and include stinging, redness and allergic reactions.
Calcipotriene Counseling:  I discussed with the patient the risks of calcipotriene including but not limited to erythema, scaling, itching, and irritation.
Saxenda Counseling: I reviewed the possible side effects including: thyroid tumors, kidney disease, gallbladder disease, abdominal pain, constipation, diarrhea, nausea, vomiting and pancreatitis. Do not take this medication if you have a history or family history of multiple endocrine neoplasia syndrome type 2. Side effects reviewed, pt to contact office should one occur.
Dapsone Counseling: I discussed with the patient the risks of dapsone including but not limited to hemolytic anemia, agranulocytosis, rashes, methemoglobinemia, kidney failure, peripheral neuropathy, headaches, GI upset, and liver toxicity.  Patients who start dapsone require monitoring including baseline LFTs and weekly CBCs for the first month, then every month thereafter.  The patient verbalized understanding of the proper use and possible adverse effects of dapsone.  All of the patient's questions and concerns were addressed.
Terbinafine Pregnancy And Lactation Text: This medication is Pregnancy Category B and is considered safe during pregnancy. It is also excreted in breast milk and breast feeding isn't recommended.
Xelолегz Pregnancy And Lactation Text: This medication is Pregnancy Category D and is not considered safe during pregnancy.  The risk during breast feeding is also uncertain.
Calcipotriene Pregnancy And Lactation Text: The use of this medication during pregnancy or lactation is not recommended as there is insufficient data.
Tazorac Pregnancy And Lactation Text: This medication is not safe during pregnancy. It is unknown if this medication is excreted in breast milk.
Otezla Pregnancy And Lactation Text: This medication is Pregnancy Category C and it isn't known if it is safe during pregnancy. It is unknown if it is excreted in breast milk.
Hyrimoz Counseling:  I discussed with the patient the risks of adalimumab including but not limited to myelosuppression, immunosuppression, autoimmune hepatitis, demyelinating diseases, lymphoma, and serious infections.  The patient understands that monitoring is required including a PPD at baseline and must alert us or the primary physician if symptoms of infection or other concerning signs are noted.
Opioid Counseling: I discussed with the patient the potential side effects of opioids including but not limited to addiction, altered mental status, and depression. I stressed avoiding alcohol, benzodiazepines, muscle relaxants and sleep aids unless specifically okayed by a physician. The patient verbalized understanding of the proper use and possible adverse effects of opioids. All of the patient's questions and concerns were addressed. They were instructed to flush the remaining pills down the toilet if they did not need them for pain.
Mirvaso Pregnancy And Lactation Text: This medication has not been assigned a Pregnancy Risk Category. It is unknown if the medication is excreted in breast milk.
Cantharidin Counseling:  I discussed with the patient the risks of Cantharidin including but not limited to pain, redness, burning, itching, and blistering.
Semaglutide Counseling: I reviewed the possible side effects including: thyroid tumors, kidney disease, gallbladder disease, abdominal pain, constipation, diarrhea, nausea, vomiting and pancreatitis. Do not take this medication if you have a history or family history of multiple endocrine neoplasia syndrome type 2. Side effects reviewed, pt to contact office should one occur.
Azithromycin Counseling:  I discussed with the patient the risks of azithromycin including but not limited to GI upset, allergic reaction, drug rash, diarrhea, and yeast infections.
Dapsone Pregnancy And Lactation Text: This medication is Pregnancy Category C and is not considered safe during pregnancy or breast feeding.
Topical Clindamycin Counseling: Patient counseled that this medication may cause skin irritation or allergic reactions.  In the event of skin irritation, the patient was advised to reduce the amount of the drug applied or use it less frequently.   The patient verbalized understanding of the proper use and possible adverse effects of clindamycin.  All of the patient's questions and concerns were addressed.
Oxybutynin Counseling:  I discussed with the patient the risks of oxybutynin including but not limited to skin rash, drowsiness, dry mouth, difficulty urinating, and blurred vision.
Winlevi Counseling:  I discussed with the patient the risks of topical clascoterone including but not limited to erythema, scaling, itching, and stinging. Patient voiced their understanding.
Opioid Pregnancy And Lactation Text: These medications can lead to premature delivery and should be avoided during pregnancy. These medications are also present in breast milk in small amounts.
Opzelura Counseling:  I discussed with the patient the risks of Opzelura including but not limited to nasopharngitis, bronchitis, ear infection, eosinophila, hives, diarrhea, folliculitis, tonsillitis, and rhinorrhea.  Taken orally, this medication has been linked to serious infections; higher rate of mortality; malignancy and lymphoproliferative disorders; major adverse cardiovascular events; thrombosis; thrombocytopenia, anemia, and neutropenia; and lipid elevations.
Azithromycin Pregnancy And Lactation Text: This medication is considered safe during pregnancy and is also secreted in breast milk.
Gabapentin Counseling: I discussed with the patient the risks of gabapentin including but not limited to dizziness, somnolence, fatigue and ataxia.
Ilumya Counseling: I discussed with the patient the risks of tildrakizumab including but not limited to immunosuppression, malignancy, posterior leukoencephalopathy syndrome, and serious infections.  The patient understands that monitoring is required including a PPD at baseline and must alert us or the primary physician if symptoms of infection or other concerning signs are noted.
5-Fu Counseling: 5-Fluorouracil Counseling:  I discussed with the patient the risks of 5-fluorouracil including but not limited to erythema, scaling, itching, weeping, crusting, and pain.
Topical Clindamycin Pregnancy And Lactation Text: This medication is Pregnancy Category B and is considered safe during pregnancy. It is unknown if it is excreted in breast milk.
Winlevi Pregnancy And Lactation Text: This medication is considered safe during pregnancy and breastfeeding.
Imiquimod Counseling:  I discussed with the patient the risks of imiquimod including but not limited to erythema, scaling, itching, weeping, crusting, and pain.  Patient understands that the inflammatory response to imiquimod is variable from person to person and was educated regarded proper titration schedule.  If flu-like symptoms develop, patient knows to discontinue the medication and contact us.
Odomzo Counseling- I discussed with the patient the risks of Odomzo including but not limited to nausea, vomiting, diarrhea, constipation, weight loss, changes in the sense of taste, decreased appetite, muscle spasms, and hair loss.  The patient verbalized understanding of the proper use and possible adverse effects of Odomzo.  All of the patient's questions and concerns were addressed.
Rifampin Counseling: I discussed with the patient the risks of rifampin including but not limited to liver damage, kidney damage, red-orange body fluids, nausea/vomiting and severe allergy.
Erythromycin Pregnancy And Lactation Text: This medication is Pregnancy Category B and is considered safe during pregnancy. It is also excreted in breast milk.
Topical Metronidazole Counseling: Metronidazole is a topical antibiotic medication. You may experience burning, stinging, redness, or allergic reactions.  Please call our office if you develop any problems from using this medication.
Nsaids Counseling: NSAID Counseling: I discussed with the patient that NSAIDs should be taken with food. Prolonged use of NSAIDs can result in the development of stomach ulcers.  Patient advised to stop taking NSAIDs if abdominal pain occurs.  The patient verbalized understanding of the proper use and possible adverse effects of NSAIDs.  All of the patient's questions and concerns were addressed.
Simlandi Counseling:  I discussed with the patient the risks of adalimumab including but not limited to myelosuppression, immunosuppression, autoimmune hepatitis, demyelinating diseases, lymphoma, and serious infections.  The patient understands that monitoring is required including a PPD at baseline and must alert us or the primary physician if symptoms of infection or other concerning signs are noted.
Sotyktu Pregnancy And Lactation Text: There is insufficient data to evaluate whether or not Sotyktu is safe to use during pregnancy.   It is not known if Sotyktu passes into breast milk and whether or not it is safe to use when breastfeeding.  
Griseofulvin Pregnancy And Lactation Text: This medication is Pregnancy Category X and is known to cause serious birth defects. It is unknown if this medication is excreted in breast milk but breast feeding should be avoided.
Dupixent Pregnancy And Lactation Text: This medication likely crosses the placenta but the risk for the fetus is uncertain. This medication is excreted in breast milk.
Valtrex Pregnancy And Lactation Text: this medication is Pregnancy Category B and is considered safe during pregnancy. This medication is not directly found in breast milk but it's metabolite acyclovir is present.
Birth Control Pills Pregnancy And Lactation Text: This medication should be avoided if pregnant and for the first 30 days post-partum.
Doxepin Counseling:  Patient advised that the medication is sedating and not to drive a car after taking this medication. Patient informed of potential adverse effects including but not limited to dry mouth, urinary retention, and blurry vision.  The patient verbalized understanding of the proper use and possible adverse effects of doxepin.  All of the patient's questions and concerns were addressed.
Litfulo Pregnancy And Lactation Text: Based on animal studies, Lifulo may cause embryo-fetal harm when administered to pregnant women.  The medication should not be used in pregnancy.  Breastfeeding is not recommended during treatment.
Benzoyl Peroxide Counseling: Patient counseled that medicine may cause skin irritation and bleach clothing.  In the event of skin irritation, the patient was advised to reduce the amount of the drug applied or use it less frequently.   The patient verbalized understanding of the proper use and possible adverse effects of benzoyl peroxide.  All of the patient's questions and concerns were addressed.
Ebglyss Counseling: I discussed with the patient the risks of lebrikizumab including but not limited to eye inflammation and irritation, cold sores, injection site reactions, allergic reactions and increased risk of parasitic infection. The patient understands that monitoring is required and they must alert us or the primary physician if symptoms of infection or other concerning signs are noted.
Arava Counseling:  Patient counseled regarding adverse effects of Arava including but not limited to nausea, vomiting, abnormalities in liver function tests. Patients may develop mouth sores, rash, diarrhea, and abnormalities in blood counts. The patient understands that monitoring is required including LFTs and blood counts.  There is a rare possibility of scarring of the liver and lung problems that can occur when taking methotrexate. Persistent nausea, loss of appetite, pale stools, dark urine, cough, and shortness of breath should be reported immediately. Patient advised to discontinue Arava treatment and consult with a physician prior to attempting conception. The patient will have to undergo a treatment to eliminate Arava from the body prior to conception.
Cyclosporine Counseling:  I discussed with the patient the risks of cyclosporine including but not limited to hypertension, gingival hyperplasia,myelosuppression, immunosuppression, liver damage, kidney damage, neurotoxicity, lymphoma, and serious infections. The patient understands that monitoring is required including baseline blood pressure, CBC, CMP, lipid panel and uric acid, and then 1-2 times monthly CMP and blood pressure.
Rifampin Pregnancy And Lactation Text: This medication is Pregnancy Category C and it isn't know if it is safe during pregnancy. It is also excreted in breast milk and should not be used if you are breast feeding.
Solaraze Pregnancy And Lactation Text: This medication is Pregnancy Category B and is considered safe. There is some data to suggest avoiding during the third trimester. It is unknown if this medication is excreted in breast milk.
Tremfya Counseling: I discussed with the patient the risks of guselkumab including but not limited to immunosuppression, serious infections, and drug reactions.  The patient understands that monitoring is required including a PPD at baseline and must alert us or the primary physician if symptoms of infection or other concerning signs are noted.
Nsaids Pregnancy And Lactation Text: These medications are considered safe up to 30 weeks gestation. It is excreted in breast milk.
Topical Metronidazole Pregnancy And Lactation Text: This medication is Pregnancy Category B and considered safe during pregnancy.  It is also considered safe to use while breastfeeding.
Itraconazole Counseling:  I discussed with the patient the risks of itraconazole including but not limited to liver damage, nausea/vomiting, neuropathy, and severe allergy.  The patient understands that this medication is best absorbed when taken with acidic beverages such as non-diet cola or ginger ale.  The patient understands that monitoring is required including baseline LFTs and repeat LFTs at intervals.  The patient understands that they are to contact us or the primary physician if concerning signs are noted.
Soolantra Counseling: I discussed with the patients the risks of topial Soolantra. This is a medicine which decreases the number of mites and inflammation in the skin. You experience burning, stinging, eye irritation or allergic reactions.  Please call our office if you develop any problems from using this medication.
Odomzo Pregnancy And Lactation Text: This medication is Pregnancy Category X and is absolutely contraindicated during pregnancy. It is unknown if it is excreted in breast milk.
Spironolactone Counseling: Patient advised regarding risks of diarrhea, abdominal pain, hyperkalemia, birth defects (for female patients), liver toxicity and renal toxicity. The patient may need blood work to monitor liver and kidney function and potassium levels while on therapy. The patient verbalized understanding of the proper use and possible adverse effects of spironolactone.  All of the patient's questions and concerns were addressed.
Doxepin Pregnancy And Lactation Text: This medication is Pregnancy Category C and it isn't known if it is safe during pregnancy. It is also excreted in breast milk and breast feeding isn't recommended.
Use Enhanced Medication Counseling?: No
Benzoyl Peroxide Pregnancy And Lactation Text: This medication is Pregnancy Category C. It is unknown if benzoyl peroxide is excreted in breast milk.
Olanzapine Counseling- I discussed with the patient the common side effects of olanzapine including but are not limited to: lack of energy, dry mouth, increased appetite, sleepiness, tremor, constipation, dizziness, changes in behavior, or restlessness.  Explained that teenagers are more likely to experience headaches, abdominal pain, pain in the arms or legs, tiredness, and sleepiness.  Serious side effects include but are not limited: increased risk of death in elderly patients who are confused, have memory loss, or dementia-related psychosis; hyperglycemia; increased cholesterol and triglycerides; and weight gain.
Ebglyss Pregnancy And Lactation Text: This medication likely crosses the placenta but the risk for the fetus is uncertain. It is unknown if this medication is excreted in breast milk.
Tremfya Pregnancy And Lactation Text: The risk during pregnancy and breastfeeding is uncertain with this medication.
Olumiant Counseling: I discussed with the patient the risks of Olumiant therapy including but not limited to upper respiratory tract infections, shingles, cold sores, and nausea. Live vaccines should be avoided.  This medication has been linked to serious infections; higher rate of mortality; malignancy and lymphoproliferative disorders; major adverse cardiovascular events; thrombosis; gastrointestinal perforations; neutropenia; lymphopenia; anemia; liver enzyme elevations; and lipid elevations.
Topical Steroids Counseling: I discussed with the patient that prolonged use of topical steroids can result in the increased appearance of superficial blood vessels (telangiectasias), lightening (hypopigmentation) and thinning of the skin (atrophy).  Patient understands to avoid using high potency steroids in skin folds, the groin or the face.  The patient verbalized understanding of the proper use and possible adverse effects of topical steroids.  All of the patient's questions and concerns were addressed.
Klisyri Counseling:  I discussed with the patient the risks of Klisyri including but not limited to erythema, scaling, itching, weeping, crusting, and pain.
Sarecycline Counseling: Patient advised regarding possible photosensitivity and discoloration of the teeth, skin, lips, tongue and gums.  Patient instructed to avoid sunlight, if possible.  When exposed to sunlight, patients should wear protective clothing, sunglasses, and sunscreen.  The patient was instructed to call the office immediately if the following severe adverse effects occur:  hearing changes, easy bruising/bleeding, severe headache, or vision changes.  The patient verbalized understanding of the proper use and possible adverse effects of sarecycline.  All of the patient's questions and concerns were addressed.
Clofazimine Counseling:  I discussed with the patient the risks of clofazimine including but not limited to skin and eye pigmentation, liver damage, nausea/vomiting, gastrointestinal bleeding and allergy.
Methotrexate Counseling:  Patient counseled regarding adverse effects of methotrexate including but not limited to nausea, vomiting, abnormalities in liver function tests. Patients may develop mouth sores, rash, diarrhea, and abnormalities in blood counts. The patient understands that monitoring is required including LFT's and blood counts.  There is a rare possibility of scarring of the liver and lung problems that can occur when taking methotrexate. Persistent nausea, loss of appetite, pale stools, dark urine, cough, and shortness of breath should be reported immediately. Patient advised to discontinue methotrexate treatment at least three months before attempting to become pregnant.  I discussed the need for folate supplements while taking methotrexate.  These supplements can decrease side effects during methotrexate treatment. The patient verbalized understanding of the proper use and possible adverse effects of methotrexate.  All of the patient's questions and concerns were addressed.
Carac Counseling:  I discussed with the patient the risks of Carac including but not limited to erythema, scaling, itching, weeping, crusting, and pain.
Spironolactone Pregnancy And Lactation Text: This medication can cause feminization of the male fetus and should be avoided during pregnancy. The active metabolite is also found in breast milk.
Soolantra Pregnancy And Lactation Text: This medication is Pregnancy Category C. This medication is considered safe during breast feeding.
Itraconazole Pregnancy And Lactation Text: This medication is Pregnancy Category C and it isn't know if it is safe during pregnancy. It is also excreted in breast milk.
Olumiant Pregnancy And Lactation Text: Based on animal studies, Olumiant may cause embryo-fetal harm when administered to pregnant women.  The medication should not be used in pregnancy.  Breastfeeding is not recommended during treatment.
Xolair Counseling:  Patient informed of potential adverse effects including but not limited to fever, muscle aches, rash and allergic reactions.  The patient verbalized understanding of the proper use and possible adverse effects of Xolair.  All of the patient's questions and concerns were addressed.
Hydroxyzine Counseling: Patient advised that the medication is sedating and not to drive a car after taking this medication.  Patient informed of potential adverse effects including but not limited to dry mouth, urinary retention, and blurry vision.  The patient verbalized understanding of the proper use and possible adverse effects of hydroxyzine.  All of the patient's questions and concerns were addressed.
Topical Steroids Applications Pregnancy And Lactation Text: Most topical steroids are considered safe to use during pregnancy and lactation.  Any topical steroid applied to the breast or nipple should be washed off before breastfeeding.
Olanzapine Pregnancy And Lactation Text: This medication is pregnancy category C.   There are no adequate and well controlled trials with olanzapine in pregnant females.  Olanzapine should be used during pregnancy only if the potential benefit justifies the potential risk to the fetus.   In a study in lactating healthy women, olanzapine was excreted in breast milk.  It is recommended that women taking olanzapine should not breast feed.
Detail Level: Simple
Klisyri Pregnancy And Lactation Text: It is unknown if this medication can harm a developing fetus or if it is excreted in breast milk.
Enbrel Counseling:  I discussed with the patient the risks of etanercept including but not limited to myelosuppression, immunosuppression, autoimmune hepatitis, demyelinating diseases, lymphoma, and infections.  The patient understands that monitoring is required including a PPD at baseline and must alert us or the primary physician if symptoms of infection or other concerning signs are noted.
Ozempic Counseling: I reviewed the possible side effects including: thyroid tumors, kidney disease, gallbladder disease, abdominal pain, constipation, diarrhea, nausea, vomiting and pancreatitis. Do not take this medication if you have a history or family history of multiple endocrine neoplasia syndrome type 2. Side effects reviewed, pt to contact office should one occur.
Hydroxyzine Pregnancy And Lactation Text: This medication is not safe during pregnancy and should not be taken. It is also excreted in breast milk and breast feeding isn't recommended.
Ketoconazole Counseling:   Patient counseled regarding improving absorption with orange juice.  Adverse effects include but are not limited to breast enlargement, headache, diarrhea, nausea, upset stomach, liver function test abnormalities, taste disturbance, and stomach pain.  There is a rare possibility of liver failure that can occur when taking ketoconazole. The patient understands that monitoring of LFTs may be required, especially at baseline. The patient verbalized understanding of the proper use and possible adverse effects of ketoconazole.  All of the patient's questions and concerns were addressed.
Methotrexate Pregnancy And Lactation Text: This medication is Pregnancy Category X and is known to cause fetal harm. This medication is excreted in breast milk.
Xolair Pregnancy And Lactation Text: This medication is Pregnancy Category B and is considered safe during pregnancy. This medication is excreted in breast milk.
Topical Retinoid counseling:  Patient advised to apply a pea-sized amount only at bedtime and wait 30 minutes after washing their face before applying.  If too drying, patient may add a non-comedogenic moisturizer. The patient verbalized understanding of the proper use and possible adverse effects of retinoids.  All of the patient's questions and concerns were addressed.
Rinvoq Counseling: I discussed with the patient the risks of Rinvoq therapy including but not limited to upper respiratory tract infections, shingles, cold sores, bronchitis, nausea, cough, fever, acne, and headache. Live vaccines should be avoided.  This medication has been linked to serious infections; higher rate of mortality; malignancy and lymphoproliferative disorders; major adverse cardiovascular events; thrombosis; thrombocytopenia, anemia, and neutropenia; lipid elevations; liver enzyme elevations; and gastrointestinal perforations.
Minoxidil Counseling: Minoxidil is a topical medication which can increase blood flow where it is applied. It is uncertain how this medication increases hair growth. Side effects are uncommon and include stinging and allergic reactions.
Tetracycline Counseling: Patient counseled regarding possible photosensitivity and increased risk for sunburn.  Patient instructed to avoid sunlight, if possible.  When exposed to sunlight, patients should wear protective clothing, sunglasses, and sunscreen.  The patient was instructed to call the office immediately if the following severe adverse effects occur:  hearing changes, easy bruising/bleeding, severe headache, or vision changes.  The patient verbalized understanding of the proper use and possible adverse effects of tetracycline.  All of the patient's questions and concerns were addressed. Patient understands to avoid pregnancy while on therapy due to potential birth defects.
Topical Sulfur Applications Counseling: Topical Sulfur Counseling: Patient counseled that this medication may cause skin irritation or allergic reactions.  In the event of skin irritation, the patient was advised to reduce the amount of the drug applied or use it less frequently.   The patient verbalized understanding of the proper use and possible adverse effects of topical sulfur application.  All of the patient's questions and concerns were addressed.
Oral Minoxidil Counseling- I discussed with the patient the risks of oral minoxidil including but not limited to shortness of breath, swelling of the feet or ankles, dizziness, lightheadedness, unwanted hair growth and allergic reaction.  The patient verbalized understanding of the proper use and possible adverse effects of oral minoxidil.  All of the patient's questions and concerns were addressed.
Ketoconazole Pregnancy And Lactation Text: This medication is Pregnancy Category C and it isn't know if it is safe during pregnancy. It is also excreted in breast milk and breast feeding isn't recommended.
Colchicine Counseling:  Patient counseled regarding adverse effects including but not limited to stomach upset (nausea, vomiting, stomach pain, or diarrhea).  Patient instructed to limit alcohol consumption while taking this medication.  Colchicine may reduce blood counts especially with prolonged use.  The patient understands that monitoring of kidney function and blood counts may be required, especially at baseline. The patient verbalized understanding of the proper use and possible adverse effects of colchicine.  All of the patient's questions and concerns were addressed.
Prednisone Counseling:  I discussed with the patient the risks of prolonged use of prednisone including but not limited to weight gain, insomnia, osteoporosis, mood changes, diabetes, susceptibility to infection, glaucoma and high blood pressure.  In cases where prednisone use is prolonged, patients should be monitored with blood pressure checks, serum glucose levels and an eye exam.  Additionally, the patient may need to be placed on GI prophylaxis, PCP prophylaxis, and calcium and vitamin D supplementation and/or a bisphosphonate.  The patient verbalized understanding of the proper use and the possible adverse effects of prednisone.  All of the patient's questions and concerns were addressed.
Azathioprine Pregnancy And Lactation Text: This medication is Pregnancy Category D and isn't considered safe during pregnancy. It is unknown if this medication is excreted in breast milk.
Zyclara Counseling:  I discussed with the patient the risks of imiquimod including but not limited to erythema, scaling, itching, weeping, crusting, and pain.  Patient understands that the inflammatory response to imiquimod is variable from person to person and was educated regarded proper titration schedule.  If flu-like symptoms develop, patient knows to discontinue the medication and contact us.
Bexarotene Pregnancy And Lactation Text: This medication is Pregnancy Category X and should not be given to women who are pregnant or may become pregnant. This medication should not be used if you are breast feeding.
Thalidomide Counseling: I discussed with the patient the risks of thalidomide including but not limited to birth defects, anxiety, weakness, chest pain, dizziness, cough and severe allergy.
Spevigo Pregnancy And Lactation Text: The risk during pregnancy and breastfeeding is uncertain with this medication. This medication does cross the placenta. It is unknown if this medication is found in breast milk.
Nemluvio Pregnancy And Lactation Text: It is not known if Nemluvio causes fetal harm or is present in breast milk. Please proceed with caution if patients who are pregnant or breastfeeding.
Qbrexza Counseling:  I discussed with the patient the risks of Qbrexza including but not limited to headache, mydriasis, blurred vision, dry eyes, nasal dryness, dry mouth, dry throat, dry skin, urinary hesitation, and constipation.  Local skin reactions including erythema, burning, stinging, and itching can also occur.
Eucrisa Counseling: Patient may experience a mild burning sensation during topical application. Eucrisa is not approved in children less than 3 months of age.
Erivedge Counseling- I discussed with the patient the risks of Erivedge including but not limited to nausea, vomiting, diarrhea, constipation, weight loss, changes in the sense of taste, decreased appetite, muscle spasms, and hair loss.  The patient verbalized understanding of the proper use and possible adverse effects of Erivedge.  All of the patient's questions and concerns were addressed.
Minocycline Counseling: Patient advised regarding possible photosensitivity and discoloration of the teeth, skin, lips, tongue and gums.  Patient instructed to avoid sunlight, if possible.  When exposed to sunlight, patients should wear protective clothing, sunglasses, and sunscreen.  The patient was instructed to call the office immediately if the following severe adverse effects occur:  hearing changes, easy bruising/bleeding, severe headache, or vision changes.  The patient verbalized understanding of the proper use and possible adverse effects of minocycline.  All of the patient's questions and concerns were addressed.
Cimzia Pregnancy And Lactation Text: This medication crosses the placenta but can be considered safe in certain situations. Cimzia may be excreted in breast milk.
Clindamycin Pregnancy And Lactation Text: This medication can be used in pregnancy if certain situations. Clindamycin is also present in breast milk.
Albendazole Counseling:  I discussed with the patient the risks of albendazole including but not limited to cytopenia, kidney damage, nausea/vomiting and severe allergy.  The patient understands that this medication is being used in an off-label manner.
Rituxan Counseling:  I discussed with the patient the risks of Rituxan infusions. Side effects can include infusion reactions, severe drug rashes including mucocutaneous reactions, reactivation of latent hepatitis and other infections and rarely progressive multifocal leukoencephalopathy.  All of the patient's questions and concerns were addressed.
Low Dose Naltrexone Counseling- I discussed with the patient the potential risks and side effects of low dose naltrexone including but not limited to: more vivid dreams, headaches, nausea, vomiting, abdominal pain, fatigue, dizziness, and anxiety.
Finasteride Male Counseling: Finasteride Counseling:  I discussed with the patient the risks of use of finasteride including but not limited to decreased libido, decreased ejaculate volume, gynecomastia, and depression. Women should not handle medication.  All of the patient's questions and concerns were addressed.
Isotretinoin Counseling: Patient should get monthly blood tests, not donate blood, not drive at night if vision affected, not share medication, and not undergo elective surgery for 6 months after tx completed. Side effects reviewed, pt to contact office should one occur.
Aklief counseling:  Patient advised to apply a pea-sized amount only at bedtime and wait 30 minutes after washing their face before applying.  If too drying, patient may add a non-comedogenic moisturizer.  The most commonly reported side effects including irritation, redness, scaling, dryness, stinging, burning, itching, and increased risk of sunburn.  The patient verbalized understanding of the proper use and possible adverse effects of retinoids.  All of the patient's questions and concerns were addressed.
Cellcept Counseling:  I discussed with the patient the risks of mycophenolate mofetil including but not limited to infection/immunosuppression, GI upset, hypokalemia, hypercholesterolemia, bone marrow suppression, lymphoproliferative disorders, malignancy, GI ulceration/bleed/perforation, colitis, interstitial lung disease, kidney failure, progressive multifocal leukoencephalopathy, and birth defects.  The patient understands that monitoring is required including a baseline creatinine and regular CBC testing. In addition, patient must alert us immediately if symptoms of infection or other concerning signs are noted.
Cosentyx Counseling:  I discussed with the patient the risks of Cosentyx including but not limited to worsening of Crohn's disease, immunosuppression, allergic reactions and infections.  The patient understands that monitoring is required including a PPD at baseline and must alert us or the primary physician if symptoms of infection or other concerning signs are noted.
Doxycycline Counseling:  Patient counseled regarding possible photosensitivity and increased risk for sunburn.  Patient instructed to avoid sunlight, if possible.  When exposed to sunlight, patients should wear protective clothing, sunglasses, and sunscreen.  The patient was instructed to call the office immediately if the following severe adverse effects occur:  hearing changes, easy bruising/bleeding, severe headache, or vision changes.  The patient verbalized understanding of the proper use and possible adverse effects of doxycycline.  All of the patient's questions and concerns were addressed.
Albendazole Pregnancy And Lactation Text: This medication is Pregnancy Category C and it isn't known if it is safe during pregnancy. It is also excreted in breast milk.
Stelara Counseling:  I discussed with the patient the risks of ustekinumab including but not limited to immunosuppression, malignancy, posterior leukoencephalopathy syndrome, and serious infections.  The patient understands that monitoring is required including a PPD at baseline and must alert us or the primary physician if symptoms of infection or other concerning signs are noted.
Low Dose Naltrexone Pregnancy And Lactation Text: Naltrexone is pregnancy category C.  There have been no adequate and well-controlled studies in pregnant women.  It should be used in pregnancy only if the potential benefit justifies the potential risk to the fetus.   Limited data indicates that naltrexone is minimally excreted into breastmilk.
Qbrexza Pregnancy And Lactation Text: There is no available data on Qbrexza use in pregnant women.  There is no available data on Qbrexza use in lactation.
Fluconazole Counseling:  Patient counseled regarding adverse effects of fluconazole including but not limited to headache, diarrhea, nausea, upset stomach, liver function test abnormalities, taste disturbance, and stomach pain.  There is a rare possibility of liver failure that can occur when taking fluconazole.  The patient understands that monitoring of LFTs and kidney function test may be required, especially at baseline. The patient verbalized understanding of the proper use and possible adverse effects of fluconazole.  All of the patient's questions and concerns were addressed.
Rituxan Pregnancy And Lactation Text: This medication is Pregnancy Category C and it isn't know if it is safe during pregnancy. It is unknown if this medication is excreted in breast milk but similar antibodies are known to be excreted.
Tranexamic Acid Counseling:  Patient advised of the small risk of bleeding problems with tranexamic acid. They were also instructed to call if they developed any nausea, vomiting or diarrhea. All of the patient's questions and concerns were addressed.
Aklief Pregnancy And Lactation Text: It is unknown if this medication is safe to use during pregnancy.  It is unknown if this medication is excreted in breast milk.  Breastfeeding women should use the topical cream on the smallest area of the skin for the shortest time needed while breastfeeding.  Do not apply to nipple and areola.
Finasteride Female Counseling: Finasteride Counseling:  I discussed with the patient the risks of use of finasteride including but not limited to decreased libido and sexual dysfunction. Explained the teratogenic nature of the medication and stressed the importance of not getting pregnant during treatment. All of the patient's questions and concerns were addressed.
Ivermectin Counseling:  Patient instructed to take medication on an empty stomach with a full glass of water.  Patient informed of potential adverse effects including but not limited to nausea, diarrhea, dizziness, itching, and swelling of the extremities or lymph nodes.  The patient verbalized understanding of the proper use and possible adverse effects of ivermectin.  All of the patient's questions and concerns were addressed.
Niacinamide Counseling: I recommended taking niacin or niacinamide, also know as vitamin B3, twice daily. Recent evidence suggests that taking vitamin B3 (500 mg twice daily) can reduce the risk of actinic keratoses and non-melanoma skin cancers. Side effects of vitamin B3 include flushing and headache.
Doxycycline Pregnancy And Lactation Text: This medication is Pregnancy Category D and not consider safe during pregnancy. It is also excreted in breast milk but is considered safe for shorter treatment courses.
Cibinqo Counseling: I discussed with the patient the risks of Cibinqo therapy including but not limited to common cold, nausea, headache, cold sores, increased blood CPK levels, dizziness, UTIs, fatigue, acne, and vomitting. Live vaccines should be avoided.  This medication has been linked to serious infections; higher rate of mortality; malignancy and lymphoproliferative disorders; major adverse cardiovascular events; thrombosis; thrombocytopenia and lymphopenia; lipid elevations; and retinal detachment.
Isotretinoin Pregnancy And Lactation Text: This medication is Pregnancy Category X and is considered extremely dangerous during pregnancy. It is unknown if it is excreted in breast milk.
Quinolones Counseling:  I discussed with the patient the risks of fluoroquinolones including but not limited to GI upset, allergic reaction, drug rash, diarrhea, dizziness, photosensitivity, yeast infections, liver function test abnormalities, tendonitis/tendon rupture.
Libtayo Counseling- I discussed with the patient the risks of Libtayo including but not limited to nausea, vomiting, diarrhea, and bone or muscle pain.  The patient verbalized understanding of the proper use and possible adverse effects of Libtayo.  All of the patient's questions and concerns were addressed.
Rhofade Counseling: Rhofade is a topical medication which can decrease superficial blood flow where applied. Side effects are uncommon and include stinging, redness and allergic reactions.
Cyclophosphamide Counseling:  I discussed with the patient the risks of cyclophosphamide including but not limited to hair loss, hormonal abnormalities, decreased fertility, abdominal pain, diarrhea, nausea and vomiting, bone marrow suppression and infection. The patient understands that monitoring is required while taking this medication.
Azelaic Acid Counseling: Patient counseled that medicine may cause skin irritation and to avoid applying near the eyes.  In the event of skin irritation, the patient was advised to reduce the amount of the drug applied or use it less frequently.   The patient verbalized understanding of the proper use and possible adverse effects of azelaic acid.  All of the patient's questions and concerns were addressed.
Tranexamic Acid Pregnancy And Lactation Text: It is unknown if this medication is safe during pregnancy or breast feeding.
Siliq Counseling:  I discussed with the patient the risks of Siliq including but not limited to new or worsening depression, suicidal thoughts and behavior, immunosuppression, malignancy, posterior leukoencephalopathy syndrome, and serious infections.  The patient understands that monitoring is required including a PPD at baseline and must alert us or the primary physician if symptoms of infection or other concerning signs are noted. There is also a special program designed to monitor depression which is required with Siliq.
Finasteride Pregnancy And Lactation Text: This medication is absolutely contraindicated during pregnancy. It is unknown if it is excreted in breast milk.
Hydroquinone Counseling:  Patient advised that medication may result in skin irritation, lightening (hypopigmentation), dryness, and burning.  In the event of skin irritation, the patient was advised to reduce the amount of the drug applied or use it less frequently.  Rarely, spots that are treated with hydroquinone can become darker (pseudoochronosis).  Should this occur, patient instructed to stop medication and call the office. The patient verbalized understanding of the proper use and possible adverse effects of hydroquinone.  All of the patient's questions and concerns were addressed.
Cibinqo Pregnancy And Lactation Text: It is unknown if this medication will adversely affect pregnancy or breast feeding.  You should not take this medication if you are currently pregnant or planning a pregnancy or while breastfeeding.
High Dose Vitamin A Counseling: Side effects reviewed, pt to contact office should one occur.
Cimetidine Counseling:  I discussed with the patient the risks of Cimetidine including but not limited to gynecomastia, headache, diarrhea, nausea, drowsiness, arrhythmias, pancreatitis, skin rashes, psychosis, bone marrow suppression and kidney toxicity.
Sotyktu Counseling:  I discussed the most common side effects of Sotyktu including: common cold, sore throat, sinus infections, cold sores, canker sores, folliculitis, and acne.  I also discussed more serious side effects of Sotyktu including but not limited to: serious allergic reactions; increased risk for infections such as TB; cancers such as lymphomas; rhabdomyolysis and elevated CPK; and elevated triglycerides and liver enzymes. 
Niacinamide Pregnancy And Lactation Text: These medications are considered safe during pregnancy.
Taltz Counseling: I discussed with the patient the risks of ixekizumab including but not limited to immunosuppression, serious infections, worsening of inflammatory bowel disease and drug reactions.  The patient understands that monitoring is required including a PPD at baseline and must alert us or the primary physician if symptoms of infection or other concerning signs are noted.
Dupixent Counseling: I discussed with the patient the risks of dupilumab including but not limited to eye infection and irritation, cold sores, injection site reactions, worsening of asthma, allergic reactions and increased risk of parasitic infection.  Live vaccines should be avoided while taking dupilumab. Dupilumab will also interact with certain medications such as warfarin and cyclosporine. The patient understands that monitoring is required and they must alert us or the primary physician if symptoms of infection or other concerning signs are noted.
Libtayo Pregnancy And Lactation Text: This medication is contraindicated in pregnancy and when breast feeding.
Cantharidin Pregnancy And Lactation Text: This medication has not been proven safe during pregnancy. It is unknown if this medication is excreted in breast milk.
Birth Control Pills Counseling: Birth Control Pill Counseling: I discussed with the patient the potential side effects of OCPs including but not limited to increased risk of stroke, heart attack, thrombophlebitis, deep venous thrombosis, hepatic adenomas, breast changes, GI upset, headaches, and depression.  The patient verbalized understanding of the proper use and possible adverse effects of OCPs. All of the patient's questions and concerns were addressed.
Erythromycin Counseling:  I discussed with the patient the risks of erythromycin including but not limited to GI upset, allergic reaction, drug rash, diarrhea, increase in liver enzymes, and yeast infections.
Azelaic Acid Pregnancy And Lactation Text: This medication is considered safe during pregnancy and breast feeding.
Cyclophosphamide Pregnancy And Lactation Text: This medication is Pregnancy Category D and it isn't considered safe during pregnancy. This medication is excreted in breast milk.
Griseofulvin Counseling:  I discussed with the patient the risks of griseofulvin including but not limited to photosensitivity, cytopenia, liver damage, nausea/vomiting and severe allergy.  The patient understands that this medication is best absorbed when taken with a fatty meal (e.g., ice cream or french fries).
Valtrex Counseling: I discussed with the patient the risks of valacyclovir including but not limited to kidney damage, nausea, vomiting and severe allergy.  The patient understands that if the infection seems to be worsening or is not improving, they are to call.
Solaraze Counseling:  I discussed with the patient the risks of Solaraze including but not limited to erythema, scaling, itching, weeping, crusting, and pain.
Litfulo Counseling: I discussed with the patient the risks of Litfulo therapy including but not limited to upper respiratory tract infections, shingles, cold sores, and nausea. Live vaccines should be avoided.  This medication has been linked to serious infections; higher rate of mortality; malignancy and lymphoproliferative disorders; major adverse cardiovascular events; thrombosis; gastrointestinal perforations; neutropenia; lymphopenia; anemia; liver enzyme elevations; and lipid elevations.
Bactrim Counseling:  I discussed with the patient the risks of sulfa antibiotics including but not limited to GI upset, allergic reaction, drug rash, diarrhea, dizziness, photosensitivity, and yeast infections.  Rarely, more serious reactions can occur including but not limited to aplastic anemia, agranulocytosis, methemoglobinemia, blood dyscrasias, liver or kidney failure, lung infiltrates or desquamative/blistering drug rashes.
Simponi Counseling:  I discussed with the patient the risks of golimumab including but not limited to myelosuppression, immunosuppression, autoimmune hepatitis, demyelinating diseases, lymphoma, and serious infections.  The patient understands that monitoring is required including a PPD at baseline and must alert us or the primary physician if symptoms of infection or other concerning signs are noted.
Topical Ketoconazole Counseling: Patient counseled that this medication may cause skin irritation or allergic reactions.  In the event of skin irritation, the patient was advised to reduce the amount of the drug applied or use it less frequently.   The patient verbalized understanding of the proper use and possible adverse effects of ketoconazole.  All of the patient's questions and concerns were addressed.
Opzelura Pregnancy And Lactation Text: There is insufficient data to evaluate drug-associated risk for major birth defects, miscarriage, or other adverse maternal or fetal outcomes.  There is a pregnancy registry that monitors pregnancy outcomes in pregnant persons exposed to the medication during pregnancy.  It is unknown if this medication is excreted in breast milk.  Do not breastfeed during treatment and for about 4 weeks after the last dose.
Adbry Counseling: I discussed with the patient the risks of tralokinumab including but not limited to eye infection and irritation, cold sores, injection site reactions, worsening of asthma, allergic reactions and increased risk of parasitic infection.  Live vaccines should be avoided while taking tralokinumab. The patient understands that monitoring is required and they must alert us or the primary physician if symptoms of infection or other concerning signs are noted.
Propranolol Counseling:  I discussed with the patient the risks of propranolol including but not limited to low heart rate, low blood pressure, low blood sugar, restlessness and increased cold sensitivity. They should call the office if they experience any of these side effects.
VTAMA Counseling: I discussed with the patient that VTAMA is not for use in the eyes, mouth or mouth. They should call the office if they develop any signs of allergic reactions to VTAMA. The patient verbalized understanding of the proper use and possible adverse effects of VTAMA.  All of the patient's questions and concerns were addressed.
Picato Counseling:  I discussed with the patient the risks of Picato including but not limited to erythema, scaling, itching, weeping, crusting, and pain.
Wegovy Counseling: I reviewed the possible side effects including: thyroid tumors, kidney disease, gallbladder disease, abdominal pain, constipation, diarrhea, nausea, vomiting and pancreatitis. Do not take this medication if you have a history or family history of multiple endocrine neoplasia syndrome type 2. Side effects reviewed, pt to contact office should one occur.
Bactrim Pregnancy And Lactation Text: This medication is Pregnancy Category D and is known to cause fetal risk.  It is also excreted in breast milk.
Glycopyrrolate Counseling:  I discussed with the patient the risks of glycopyrrolate including but not limited to skin rash, drowsiness, dry mouth, difficulty urinating, and blurred vision.
Acitretin Counseling:  I discussed with the patient the risks of acitretin including but not limited to hair loss, dry lips/skin/eyes, liver damage, hyperlipidemia, depression/suicidal ideation, photosensitivity.  Serious rare side effects can include but are not limited to pancreatitis, pseudotumor cerebri, bony changes, clot formation/stroke/heart attack.  Patient understands that alcohol is contraindicated since it can result in liver toxicity and significantly prolong the elimination of the drug by many years.
Drysol Counseling:  I discussed with the patient the risks of drysol/aluminum chloride including but not limited to skin rash, itching, irritation, burning.
Adbry Pregnancy And Lactation Text: It is unknown if this medication will adversely affect pregnancy or breast feeding.
High Dose Vitamin A Pregnancy And Lactation Text: High dose vitamin A therapy is contraindicated during pregnancy and breast feeding.
Vtama Pregnancy And Lactation Text: It is unknown if this medication can cause problems during pregnancy and breastfeeding.
Propranolol Pregnancy And Lactation Text: This medication is Pregnancy Category C and it isn't known if it is safe during pregnancy. It is excreted in breast milk.
Cephalexin Counseling: I counseled the patient regarding use of cephalexin as an antibiotic for prophylactic and/or therapeutic purposes. Cephalexin (commonly prescribed under brand name Keflex) is a cephalosporin antibiotic which is active against numerous classes of bacteria, including most skin bacteria. Side effects may include nausea, diarrhea, gastrointestinal upset, rash, hives, yeast infections, and in rare cases, hepatitis, kidney disease, seizures, fever, confusion, neurologic symptoms, and others. Patients with severe allergies to penicillin medications are cautioned that there is about a 10% incidence of cross-reactivity with cephalosporins. When possible, patients with penicillin allergies should use alternatives to cephalosporins for antibiotic therapy.
Skyrizi Counseling: I discussed with the patient the risks of risankizumab-rzaa including but not limited to immunosuppression, and serious infections.  The patient understands that monitoring is required including a PPD at baseline and must alert us or the primary physician if symptoms of infection or other concerning signs are noted.
Infliximab Counseling:  I discussed with the patient the risks of infliximab including but not limited to myelosuppression, immunosuppression, autoimmune hepatitis, demyelinating diseases, lymphoma, and serious infections.  The patient understands that monitoring is required including a PPD at baseline and must alert us or the primary physician if symptoms of infection or other concerning signs are noted.
Bimzelx Counseling:  I discussed with the patient the risks of Bimzelx including but not limited to depression, immunosuppression, allergic reactions and infections.  The patient understands that monitoring is required including a PPD at baseline and must alert us or the primary physician if symptoms of infection or other concerning signs are noted.
Glycopyrrolate Pregnancy And Lactation Text: This medication is Pregnancy Category B and is considered safe during pregnancy. It is unknown if it is excreted breast milk.
Zepbound Counseling: I reviewed the possible side effects including: thyroid tumors, kidney disease, gallbladder disease, abdominal pain, constipation, diarrhea, nausea, vomiting and pancreatitis. Do not take this medication if you have a history or family history of multiple endocrine neoplasia syndrome type 2. Side effects reviewed, pt to contact office should one occur.
SSKI Counseling:  I discussed with the patient the risks of SSKI including but not limited to thyroid abnormalities, metallic taste, GI upset, fever, headache, acne, arthralgias, paraesthesias, lymphadenopathy, easy bleeding, arrhythmias, and allergic reaction.
Zoryve Counseling:  I discussed with the patient that Zoryve is not for use in the eyes, mouth or vagina. The most commonly reported side effects include diarrhea, headache, insomnia, application site pain, upper respiratory tract infections, and urinary tract infections.  All of the patient's questions and concerns were addressed.
Acitretin Pregnancy And Lactation Text: This medication is Pregnancy Category X and should not be given to women who are pregnant or may become pregnant in the future. This medication is excreted in breast milk.
Dutasteride Male Counseling: Dustasteride Counseling:  I discussed with the patient the risks of use of dutasteride including but not limited to decreased libido, decreased ejaculate volume, and gynecomastia. Women who can become pregnant should not handle medication.  All of the patient's questions and concerns were addressed.
Metronidazole Counseling:  I discussed with the patient the risks of metronidazole including but not limited to seizures, nausea/vomiting, a metallic taste in the mouth, nausea/vomiting and severe allergy.
Bimzelx Pregnancy And Lactation Text: This medication crosses the placenta and the safety is uncertain during pregnancy. It is unknown if this medication is present in breast milk.
Protopic Counseling: Patient may experience a mild burning sensation during topical application. Protopic is not approved in children less than 2 years of age. There have been case reports of hematologic and skin malignancies in patients using topical calcineurin inhibitors although causality is questionable.
Hydroxychloroquine Counseling:  I discussed with the patient that a baseline ophthalmologic exam is needed at the start of therapy and every year thereafter while on therapy. A CBC may also be warranted for monitoring.  The side effects of this medication were discussed with the patient, including but not limited to agranulocytosis, aplastic anemia, seizures, rashes, retinopathy, and liver toxicity. Patient instructed to call the office should any adverse effect occur.  The patient verbalized understanding of the proper use and possible adverse effects of Plaquenil.  All the patient's questions and concerns were addressed.
Cephalexin Pregnancy And Lactation Text: This medication is Pregnancy Category B and considered safe during pregnancy.  It is also excreted in breast milk but can be used safely for shorter doses.
Sski Pregnancy And Lactation Text: This medication is Pregnancy Category D and isn't considered safe during pregnancy. It is excreted in breast milk.
Nemluvio Counseling: I discussed with the patient the risks of nemolizumab including but not limited to headache, gastrointestinal complaints, nasopharyngitis, musculoskeletal complaints, injection site reactions, and allergic reactions. The patient understands that monitoring is required and they must alert us or the primary physician if any side effects are noted.
Dutasteride Female Counseling: Dutasteride Counseling:  I discussed with the patient the risks of use of dutasteride including but not limited to decreased libido and sexual dysfunction. Explained the teratogenic nature of the medication and stressed the importance of not getting pregnant during treatment. All of the patient's questions and concerns were addressed.
Protopic Pregnancy And Lactation Text: This medication is Pregnancy Category C. It is unknown if this medication is excreted in breast milk when applied topically.
Elidel Counseling: Patient may experience a mild burning sensation during topical application. Elidel is not approved in children less than 2 years of age. There have been case reports of hematologic and skin malignancies in patients using topical calcineurin inhibitors although causality is questionable.
Azathioprine Counseling:  I discussed with the patient the risks of azathioprine including but not limited to myelosuppression, immunosuppression, hepatotoxicity, lymphoma, and infections.  The patient understands that monitoring is required including baseline LFTs, Creatinine, possible TPMP genotyping and weekly CBCs for the first month and then every 2 weeks thereafter.  The patient verbalized understanding of the proper use and possible adverse effects of azathioprine.  All of the patient's questions and concerns were addressed.
Bexarotene Counseling:  I discussed with the patient the risks of bexarotene including but not limited to hair loss, dry lips/skin/eyes, liver abnormalities, hyperlipidemia, pancreatitis, depression/suicidal ideation, photosensitivity, drug rash/allergic reactions, hypothyroidism, anemia, leukopenia, infection, cataracts, and teratogenicity.  Patient understands that they will need regular blood tests to check lipid profile, liver function tests, white blood cell count, thyroid function tests and pregnancy test if applicable.
Hydroxychloroquine Pregnancy And Lactation Text: This medication has been shown to cause fetal harm but it isn't assigned a Pregnancy Risk Category. There are small amounts excreted in breast milk.
Clindamycin Counseling: I counseled the patient regarding use of clindamycin as an antibiotic for prophylactic and/or therapeutic purposes. Clindamycin is active against numerous classes of bacteria, including skin bacteria. Side effects may include nausea, diarrhea, gastrointestinal upset, rash, hives, yeast infections, and in rare cases, colitis.
Spevigo Counseling: I discussed with the patient the risks of Spevigo including but not limited to fatigue, nasuea, vomiting, headache, pruritus, urinary tract infection, an infusion related reactions.  The patient understands that monitoring is required including screening for tuberculosis at baseline and yearly screening thereafter while continuing Spevigo therapy. They should contact us if symptoms of infection or other concerning signs are noted.
Dutasteride Pregnancy And Lactation Text: This medication is absolutely contraindicated in women, especially during pregnancy and breast feeding. Feminization of male fetuses is possible if taking while pregnant.
Cimzia Counseling:  I discussed with the patient the risks of Cimzia including but not limited to immunosuppression, allergic reactions and infections.  The patient understands that monitoring is required including a PPD at baseline and must alert us or the primary physician if symptoms of infection or other concerning signs are noted.
Metronidazole Pregnancy And Lactation Text: This medication is Pregnancy Category B and considered safe during pregnancy.  It is also excreted in breast milk.

## 2025-02-24 NOTE — PROCEDURE: ADDITIONAL NOTES
Render Risk Assessment In Note?: no
Additional Notes: Instructed to treat this site with 5FU , pt has rx at home.
Detail Level: Simple

## 2025-02-24 NOTE — ASSESSMENT & PLAN NOTE
BP today 110/70.  Continues losartan 25 mg daily.  Home /80 and afternoon similar.  No chest pain, shortness of breath, dizziness or cough.

## 2025-02-27 NOTE — Clinical Note
REFERRAL APPROVAL NOTICE         Sent on February 26, 2025                   Raj Melo  [address] [facility] [address]                   Dear Mr. Melo,    After a careful review of the medical information and benefit coverage, [facility] has processed your referral. See below for additional details.    If applicable, you must be actively enrolled with your insurance for coverage of the authorized service. If you have any questions regarding your coverage, please contact your insurance directly.    REFERRAL INFORMATION   Referral #:  [id number]  Referred-To Provider    Referred-By Provider:  Gastroenterology    FERNY Salazar   [facility]      [address] [id number] [phone] [address] [facility] [address]  [phone]    Referral Start Date:  02/24/2025  Referral End Date:   02/24/2026             SCHEDULING  If you do not already have an appointment, please call [phone] to make an appointment.     MORE INFORMATION  If you do not already have a [employer] account, sign up at: [email or web address]  You can access your medical information, make appointments, see lab results, billing information, and more.  If you have questions regarding this referral, please contact  the [facility] Referrals department at:             [phone]. Monday - Friday 8:00AM - 5:00PM.     Sincerely,    [facility]

## 2025-07-14 ENCOUNTER — APPOINTMENT (OUTPATIENT)
Dept: LAB | Facility: MEDICAL CENTER | Age: 60
End: 2025-07-14
Payer: COMMERCIAL

## 2025-08-12 ENCOUNTER — RESULTS FOLLOW-UP (OUTPATIENT)
Dept: MEDICAL GROUP | Facility: PHYSICIAN GROUP | Age: 60
End: 2025-08-12

## 2025-08-12 ENCOUNTER — HOSPITAL ENCOUNTER (OUTPATIENT)
Dept: LAB | Facility: MEDICAL CENTER | Age: 60
End: 2025-08-12
Attending: NURSE PRACTITIONER
Payer: COMMERCIAL

## 2025-08-12 DIAGNOSIS — Z13.228 SCREENING FOR ENDOCRINE, METABOLIC AND IMMUNITY DISORDER: ICD-10-CM

## 2025-08-12 DIAGNOSIS — Z13.0 SCREENING FOR ENDOCRINE, METABOLIC AND IMMUNITY DISORDER: ICD-10-CM

## 2025-08-12 DIAGNOSIS — E78.5 DYSLIPIDEMIA: ICD-10-CM

## 2025-08-12 DIAGNOSIS — Z13.29 SCREENING FOR ENDOCRINE, METABOLIC AND IMMUNITY DISORDER: ICD-10-CM

## 2025-08-12 DIAGNOSIS — Z00.00 PREVENTATIVE HEALTH CARE: ICD-10-CM

## 2025-08-12 DIAGNOSIS — Z12.5 SCREENING FOR PROSTATE CANCER: Primary | ICD-10-CM

## 2025-08-12 LAB
CHOLEST SERPL-MCNC: 203 MG/DL (ref 100–199)
HDLC SERPL-MCNC: 48 MG/DL
LDLC SERPL CALC-MCNC: 125 MG/DL
TRIGL SERPL-MCNC: 148 MG/DL (ref 0–149)

## 2025-08-12 PROCEDURE — 36415 COLL VENOUS BLD VENIPUNCTURE: CPT

## 2025-08-12 PROCEDURE — 80061 LIPID PANEL: CPT

## 2025-08-26 ENCOUNTER — APPOINTMENT (OUTPATIENT)
Dept: URBAN - METROPOLITAN AREA CLINIC 4 | Facility: CLINIC | Age: 60
Setting detail: DERMATOLOGY
End: 2025-08-26

## 2025-08-26 DIAGNOSIS — Z71.89 OTHER SPECIFIED COUNSELING: ICD-10-CM

## 2025-08-26 DIAGNOSIS — L57.0 ACTINIC KERATOSIS: ICD-10-CM

## 2025-08-26 DIAGNOSIS — L81.4 OTHER MELANIN HYPERPIGMENTATION: ICD-10-CM

## 2025-08-26 DIAGNOSIS — D22 MELANOCYTIC NEVI: ICD-10-CM

## 2025-08-26 DIAGNOSIS — L82.1 OTHER SEBORRHEIC KERATOSIS: ICD-10-CM

## 2025-08-26 PROCEDURE — ? COUNSELING

## 2025-08-26 PROCEDURE — ? SUNSCREEN RECOMMENDATIONS

## 2025-08-26 PROCEDURE — ? LIQUID NITROGEN

## 2025-08-26 ASSESSMENT — LOCATION DETAILED DESCRIPTION DERM
LOCATION DETAILED: RIGHT ULNAR DORSAL HAND
LOCATION DETAILED: RIGHT AXILLARY VAULT
LOCATION DETAILED: LEFT PROXIMAL POSTERIOR UPPER ARM
LOCATION DETAILED: LEFT SUPERIOR CENTRAL MALAR CHEEK
LOCATION DETAILED: LEFT RADIAL DORSAL HAND
LOCATION DETAILED: LEFT SUPERIOR MEDIAL UPPER BACK
LOCATION DETAILED: LEFT DISTAL POSTERIOR UPPER ARM
LOCATION DETAILED: LEFT ULNAR DORSAL HAND
LOCATION DETAILED: RIGHT DISTAL DORSAL FOREARM
LOCATION DETAILED: RIGHT CENTRAL MALAR CHEEK
LOCATION DETAILED: RIGHT MEDIAL MALAR CHEEK
LOCATION DETAILED: RIGHT PROXIMAL POSTERIOR UPPER ARM
LOCATION DETAILED: LEFT MEDIAL MALAR CHEEK
LOCATION DETAILED: RIGHT MEDIAL UPPER BACK

## 2025-08-26 ASSESSMENT — LOCATION ZONE DERM
LOCATION ZONE: AXILLAE
LOCATION ZONE: ARM
LOCATION ZONE: TRUNK
LOCATION ZONE: HAND
LOCATION ZONE: FACE

## 2025-08-26 ASSESSMENT — LOCATION SIMPLE DESCRIPTION DERM
LOCATION SIMPLE: RIGHT HAND
LOCATION SIMPLE: RIGHT UPPER ARM
LOCATION SIMPLE: RIGHT CHEEK
LOCATION SIMPLE: LEFT HAND
LOCATION SIMPLE: RIGHT UPPER BACK
LOCATION SIMPLE: LEFT UPPER BACK
LOCATION SIMPLE: RIGHT FOREARM
LOCATION SIMPLE: LEFT CHEEK
LOCATION SIMPLE: LEFT UPPER ARM
LOCATION SIMPLE: RIGHT AXILLARY VAULT

## (undated) DEVICE — SET LEADWIRE 5 LEAD BEDSIDE DISPOSABLE ECG (1SET OF 5/EA)

## (undated) DEVICE — SENSOR SPO2 NEO LNCS ADHESIVE (20/BX) SEE USER NOTES

## (undated) DEVICE — KIT ANESTHESIA W/CIRCUIT & 3/LT BAG W/FILTER (20EA/CA)

## (undated) DEVICE — SODIUM CHL IRRIGATION 0.9% 1000ML (12EA/CA)

## (undated) DEVICE — BLADE SURGICAL #15 - (50/BX 3BX/CA)

## (undated) DEVICE — ELECTRODE DUAL RETURN W/ CORD - (50/PK)

## (undated) DEVICE — SUTURE 4-0 VICRYL PLUS FS-2 - 27 INCH (36/BX)

## (undated) DEVICE — TUBING INSUFFLATION - (10/BX)

## (undated) DEVICE — NEPTUNE 4 PORT MANIFOLD - (20/PK)

## (undated) DEVICE — CANISTER SUCTION 3000ML MECHANICAL FILTER AUTO SHUTOFF MEDI-VAC NONSTERILE LF DISP  (40EA/CA)

## (undated) DEVICE — SUTURE 3-0 VICRYL PLUS SH - 8X 18 INCH (12/BX)

## (undated) DEVICE — MASK ANESTHESIA ADULT  - (100/CA)

## (undated) DEVICE — HEAD HOLDER JUNIOR/ADULT

## (undated) DEVICE — SUTURE 2-0 COATED VICRYL PLUS - 12 X 18 INCH (12/BX)

## (undated) DEVICE — SUCTION INSTRUMENT YANKAUER BULBOUS TIP W/O VENT (50EA/CA)

## (undated) DEVICE — GOWN WARMING STANDARD FLEX - (30/CA)

## (undated) DEVICE — TRAY SKIN SCRUB PVP WET (20EA/CA) PART #DYND70356 DISCONTINUED

## (undated) DEVICE — LACTATED RINGERS INJ 1000 ML - (14EA/CA 60CA/PF)

## (undated) DEVICE — TUBING CLEARLINK DUO-VENT - C-FLO (48EA/CA)

## (undated) DEVICE — GLOVE BIOGEL SZ 8 SURGICAL PF LTX - (50PR/BX 4BX/CA)

## (undated) DEVICE — SLEEVE, VASO, THIGH, MED

## (undated) DEVICE — DRAIN PENROSE STERILE 1/4 X - 18 IN  (25EA/BX)

## (undated) DEVICE — SET EXTENSION WITH 2 PORTS (48EA/CA) ***PART #2C8610 IS A SUBSTITUTE*****

## (undated) DEVICE — ELECTRODE 850 FOAM ADHESIVE - HYDROGEL RADIOTRNSPRNT (50/PK)

## (undated) DEVICE — DRESSING TRANSPARENT FILM TEGADERM 4 X 4.75" (50EA/BX)"

## (undated) DEVICE — SUTURE 0 ETHIBOND CT-2 C/R 8 X 18 (12PK/BX)"

## (undated) DEVICE — SYRINGE 10 ML CONTROL LL (25EA/BX 4BX/CA)

## (undated) DEVICE — SUTURE 2-0 PDS II CT-2 - (36/BX)

## (undated) DEVICE — BLADE SURGICAL #10 - (50/BX)

## (undated) DEVICE — SHEET TRANSVERSE LAP - (12EA/CA)

## (undated) DEVICE — DRAPE LAPAROTOMY T SHEET - (12EA/CA)

## (undated) DEVICE — NEEDLE NON SAFETY HYPO 22 GA X 1 1/2 IN (100/BX)

## (undated) DEVICE — GLOVE BIOGEL PI INDICATOR SZ 6.5 SURGICAL PF LF - (50/BX 4BX/CA)

## (undated) DEVICE — KIT ROOM DECONTAMINATION

## (undated) DEVICE — PROTECTOR ULNA NERVE - (36PR/CA)

## (undated) DEVICE — TUBE E-T HI-LO CUFF 7.5MM (10EA/PK)

## (undated) DEVICE — GLOVE BIOGEL INDICATOR SZ 8 SURGICAL PF LTX - (50/BX 4BX/CA)

## (undated) DEVICE — PACK MINOR BASIN - (2EA/CA)

## (undated) DEVICE — GLOVE BIOGEL INDICATOR SZ 8.5 SURGICAL PF LTX - (50/BX 4BX/CA)

## (undated) DEVICE — SUTURE 2-0 VICRYL PLUS SH - 27 INCH (36/BX)

## (undated) DEVICE — SUTURE GENERAL

## (undated) DEVICE — GLOVE BIOGEL ECLIPSE PF LATEX SIZE 8.0  (50PR/BX)